# Patient Record
Sex: FEMALE | Race: BLACK OR AFRICAN AMERICAN | Employment: UNEMPLOYED | URBAN - METROPOLITAN AREA
[De-identification: names, ages, dates, MRNs, and addresses within clinical notes are randomized per-mention and may not be internally consistent; named-entity substitution may affect disease eponyms.]

---

## 2017-11-16 ENCOUNTER — OFFICE VISIT (OUTPATIENT)
Dept: NEUROLOGY | Age: 42
End: 2017-11-16

## 2017-11-16 VITALS
BODY MASS INDEX: 45.36 KG/M2 | WEIGHT: 289 LBS | HEART RATE: 114 BPM | SYSTOLIC BLOOD PRESSURE: 172 MMHG | TEMPERATURE: 98.5 F | HEIGHT: 67 IN | DIASTOLIC BLOOD PRESSURE: 102 MMHG

## 2017-11-16 DIAGNOSIS — I67.1 CEREBRAL ANEURYSM: Primary | ICD-10-CM

## 2017-11-16 DIAGNOSIS — I10 ESSENTIAL HYPERTENSION: ICD-10-CM

## 2017-11-16 NOTE — PROGRESS NOTES
Bruce Flores is a 40 y.o. female presenting with Cerebral Aneurysm        HPI: Bruce Flores is a 40year old female who was referred to the [address] [place] [address] by Dr. Haider Bolivar for cerebral aneurysm. The patient has been having headaches but within the last couple months they have been every day. She reports having had Toradol injections and Fioricet. She went to see Dr. Haider Bolivar and had studies done and started topiramate which is helping. She reports her headaches as starting left occipital then involving the whole head. They are throbbing in nature and associated with lightheadedness, nausea, photophobia, and blurry vision. Sometimes they last the whole day. She reports some tingling of the fingertips on both hands but denies stroke-like symptoms. MRA brain on 10/5/17 reported a possible 2 mm saccular aneurysm arising along the right side of the distal A2 segment of the anterior cerebral artery distribution. She had a grandmother and a cousin who  from  ruptured cerebral aneurysm. She has an aunt who had a cerebral aneurysm that was treated. They were all on maternal side. The patient had an endometrial ablation. 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 Labs in [facility] reveal a hemoglobin A1c 6.6, total cholesterol 177, and . She is a nonsmoker. Blood pressure is elevated at her visit. Review of Systems   Constitutional: Negative. HENT: Negative. Eyes: Negative. Respiratory: Negative. Cardiovascular: Negative. Gastrointestinal: Negative. Genitourinary: Negative. Musculoskeletal: Negative. Skin: Negative. Neurological: Positive for headaches. Endo/Heme/Allergies: Negative. Psychiatric/Behavioral: Negative.         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 Social History Main Topics    Smoking status: Never Smoker    Smokeless tobacco: Never Used    Alcohol use Yes      Comment: socially    Drug use: No    Sexual activity: Not on file     Other Topics Concern    Not on file     Social History Narrative       Family History   Problem Relation Age of Onset    Diabetes Maternal Aunt     Hypertension Maternal Aunt     Diabetes Maternal Uncle     Hypertension Maternal Uncle        Vitals:    11/16/17 1445 11/16/17 1550   BP: (!) 170/100 (!) 172/102   Pulse: (!) 114    Temp: 98.5 °F (36.9 °C)    TempSrc: Oral    Weight: 131.1 kg (289 lb)    Height: 5' 7\" (1.702 m)         Neurologic Exam     Mental Status   Oriented to person, place, and time. Follows 3 step commands. Attention: normal. Concentration: normal.   Speech: speech is normal   Level of consciousness: alert  Knowledge: good. Normal comprehension. Cranial Nerves     CN II   Visual fields full to confrontation. CN III, IV, VI   Pupils are equal, round, and reactive to light. Extraocular motions are normal.   Nystagmus: none   Diplopia: none  Ophthalmoparesis: none  Upgaze: normal  Downgaze: normal  Conjugate gaze: present    CN V   Facial sensation intact. CN VII   Facial expression full, symmetric. CN VIII   Hearing: intact    CN XI   CN XI normal.     CN XII   Tongue deviation: none    Motor Exam     Strength   Strength 5/5 throughout.      Sensory Exam   Light touch normal.     Gait, Coordination, and Reflexes     Gait  Gait: normal    Coordination   Finger to nose coordination: normal    Tremor   Resting tremor: absent      Physical Exam   Constitutional: She is oriented to person, place, and time. She appears well-developed and well-nourished. HENT:   Head: Normocephalic and atraumatic. Eyes: EOM are normal. Pupils are equal, round, and reactive to light. Cardiovascular: Normal rate and regular rhythm. Pulmonary/Chest: Effort normal and breath sounds normal.   Neurological: She is alert and oriented to person, place, and time. She has normal strength. She has a normal Finger-Nose-Finger Test. Gait normal.   Skin: Skin is warm and dry. Psychiatric: She has a normal mood and affect. Her speech is normal and behavior is normal. Judgment and thought content normal.   Vitals reviewed. Data: Personal review of her neuroimaging studies shows:  MRI/A 10/05/2017     RA2 azygous with distal laterally directed aneurysm 2.2mm x 2.5mm x 2.2mm     LPCoA infundibulum vs 1.2mm aneurysm     RAICA infundibulum    Assessment and Plan: This is a 40year old female with right anterior cerebral artery distal aneurysm and other possible aneurysm or infundibulum. We discussed cerebral aneurysms with the aid of the aneurysm booklet and reviewed her images. We went over different methods for monitoring and treatment of cerebral aneurysm. She is quite concerned about her aneurysm considering the family history of multiple family members who have had a cerebral aneurysm and in two it had been fatal. Offered monitoring with a follow-up CTA of the head in 6 months (April 2018) versus proceeding with diagnostic cerebral angiography in order to assess her cerebral aneurysm and other possible aneurysm. This would help develop plans for possible treatment. She would like to proceed with cerebral angiography at this time. We discussed measures for good blood vessel health especially blood pressure control.   Her blood pressure is elevated into the 144X systolic at her visit. She was encouraged to follow up with her PCP to work on blood pressure control prior to this. Also encouraged weight loss and maintaining a healthy weight, diet, exercise, and continuing glycemic control. She does not smoke. She has two friends with her at her visit who provide support. The symptoms that should prompt the patient to call 911 and present to the ED were discussed. Total time 60 minutes and 40 minutes spent in counseling.

## 2017-11-16 NOTE — MR AVS SNAPSHOT
Visit Information Date & Time Provider Department Dept. Phone Encounter #  
 11/16/2017  2:00 PM Jose M Nunez, [address] [place] [address] [address] [id number] Upcoming Health Maintenance Date Due DTaP/Tdap/Td series (1 - Tdap) 4/7/1997 PAP AKA CERVICAL CYTOLOGY 4/7/1997 Influenza Age 5 to Adult 8/1/2017 Allergies as of 11/16/2017  Review Complete On: 11/16/2017 By: Jude Gan No Known Allergies Current Immunizations  Never Reviewed No immunizations on file. Not reviewed this visit Vitals BP Pulse Temp Height(growth percentile) Weight(growth percentile) BMI  
 (!) 172/102 (!) 114 98.5 °F (36.9 °C) (Oral) 5' 7\" (1.702 m) 289 lb (131.1 kg) 45.26 kg/m2 Smoking Status Never Smoker Vitals History BMI and BSA Data Body Mass Index Body Surface Area  
 45.26 kg/m 2 2.49 m 2 Preferred Pharmacy Pharmacy Name Phone [facility] [address]ina [address] [place] [address] [place] [phone] Your Updated Medication List  
  
   
This list is accurate as of: 11/16/17  4:35 PM.  Always use your most recent med list. HYZAAR PO Take  by mouth. TOPAMAX PO Take  by mouth. Introducing \[facility]\"" & [facility]! Bryan Piedra introduces [employer] patient portal. Now you can access parts of your medical record, email your doctor's office, and request medication refills online. 1. In your internet browser, go to [email or web address] 2. Click on the First Time User? Click Here link in the Sign In box. You will see the New Member Sign Up page. 3. Enter your [employer] Access Code exactly as it appears below. You will not need to use this code after youve completed the sign-up process. If you do not sign up before the expiration date, you must request a new code. · [employer] Access Code: [email or web address] Expires: 2/14/2018  4:35 PM 
 
 4. Enter the last four digits of your Social Security Number (xxxx) and Date of Birth (mm/dd/yyyy) as indicated and click Submit. You will be taken to the next sign-up page. 5. Create a [employer] ID. This will be your [employer] login ID and cannot be changed, so think of one that is secure and easy to remember. 6. Create a [employer] password. You can change your password at any time. 7. Enter your Password Reset Question and Answer. This can be used at a later time if you forget your password. 8. Enter your e-mail address. You will receive e-mail notification when new information is available in [address] 9. Click Sign Up. You can now view and download portions of your medical record. 10. Click the Download Summary menu link to download a portable copy of your medical information. If you have questions, please visit the Frequently Asked Questions section of the [employer] website. Remember, [employer] is NOT to be used for urgent needs. For medical emergencies, dial 911. Now available from your iPhone and Android! Please provide this summary of care documentation to your next provider. Your primary care clinician is listed as Tere Essex. If you have any questions after today's visit, please call [phone].

## 2017-11-30 RX ORDER — SODIUM CHLORIDE 9 MG/ML
150 INJECTION, SOLUTION INTRAVENOUS CONTINUOUS
Status: CANCELLED | OUTPATIENT
Start: 2017-11-30

## 2017-12-04 PROBLEM — I10 ESSENTIAL HYPERTENSION: Status: ACTIVE | Noted: 2017-12-04

## 2017-12-04 PROBLEM — I67.1 CEREBRAL ANEURYSM WITHOUT RUPTURE: Status: ACTIVE | Noted: 2017-12-04

## 2017-12-05 ENCOUNTER — HOSPITAL ENCOUNTER (OUTPATIENT)
Dept: INTERVENTIONAL RADIOLOGY/VASCULAR | Age: 42
Discharge: HOME | End: 2017-12-05
Attending: PSYCHIATRY & NEUROLOGY | Admitting: PSYCHIATRY & NEUROLOGY
Payer: COMMERCIAL

## 2017-12-05 VITALS
SYSTOLIC BLOOD PRESSURE: 140 MMHG | DIASTOLIC BLOOD PRESSURE: 80 MMHG | HEART RATE: 78 BPM | BODY MASS INDEX: 42.69 KG/M2 | WEIGHT: 272 LBS | OXYGEN SATURATION: 98 % | TEMPERATURE: 98.2 F | RESPIRATION RATE: 14 BRPM | HEIGHT: 67 IN

## 2017-12-05 DIAGNOSIS — I67.1 CEREBRAL ANEURYSM: ICD-10-CM

## 2017-12-05 PROBLEM — Z82.49 FAMILY HISTORY OF CEREBRAL ANEURYSM: Status: ACTIVE | Noted: 2017-12-05

## 2017-12-05 PROBLEM — E66.01 OBESITY, MORBID (HCC): Status: ACTIVE | Noted: 2017-12-05

## 2017-12-05 LAB
ABO + RH BLD: NORMAL
AMPHET UR QL SCN: NEGATIVE
APTT PPP: 31.1 SEC (ref 23–36.4)
BARBITURATES UR QL SCN: NEGATIVE
BENZODIAZ UR QL: NEGATIVE
BLOOD GROUP ANTIBODIES SERPL: NORMAL
BUN SERPL-MCNC: 11 MG/DL (ref 7–18)
CANNABINOIDS UR QL SCN: NEGATIVE
COCAINE UR QL SCN: NEGATIVE
CREAT SERPL-MCNC: 0.96 MG/DL (ref 0.6–1.3)
ERYTHROCYTE [DISTWIDTH] IN BLOOD BY AUTOMATED COUNT: 14.7 % (ref 11.6–14.5)
HCG UR QL: NEGATIVE
HCG UR QL: NEGATIVE
HCT VFR BLD AUTO: 40.6 % (ref 35–45)
HDSCOM,HDSCOM: NORMAL
HGB BLD-MCNC: 13.7 G/DL (ref 12–16)
INR PPP: 1 (ref 0.8–1.2)
MCH RBC QN AUTO: 32.7 PG (ref 24–34)
MCHC RBC AUTO-ENTMCNC: 33.7 G/DL (ref 31–37)
MCV RBC AUTO: 96.9 FL (ref 74–97)
METHADONE UR QL: NEGATIVE
OPIATES UR QL: NEGATIVE
PCP UR QL: NEGATIVE
PLATELET # BLD AUTO: 382 K/UL (ref 135–420)
PMV BLD AUTO: 9.9 FL (ref 9.2–11.8)
PROTHROMBIN TIME: 12.6 SEC (ref 11.5–15.2)
RBC # BLD AUTO: 4.19 M/UL (ref 4.2–5.3)
SPECIMEN EXP DATE BLD: NORMAL
WBC # BLD AUTO: 10.6 K/UL (ref 4.6–13.2)

## 2017-12-05 PROCEDURE — 80307 DRUG TEST PRSMV CHEM ANLYZR: CPT | Performed by: NURSE PRACTITIONER

## 2017-12-05 PROCEDURE — 85027 COMPLETE CBC AUTOMATED: CPT | Performed by: NURSE PRACTITIONER

## 2017-12-05 PROCEDURE — 74011000250 HC RX REV CODE- 250: Performed by: PSYCHIATRY & NEUROLOGY

## 2017-12-05 PROCEDURE — 81025 URINE PREGNANCY TEST: CPT | Performed by: NURSE PRACTITIONER

## 2017-12-05 PROCEDURE — 86900 BLOOD TYPING SEROLOGIC ABO: CPT | Performed by: NURSE PRACTITIONER

## 2017-12-05 PROCEDURE — 85610 PROTHROMBIN TIME: CPT | Performed by: NURSE PRACTITIONER

## 2017-12-05 PROCEDURE — 36224 PLACE CATH CAROTD ART: CPT

## 2017-12-05 PROCEDURE — 82565 ASSAY OF CREATININE: CPT | Performed by: NURSE PRACTITIONER

## 2017-12-05 PROCEDURE — 85730 THROMBOPLASTIN TIME PARTIAL: CPT | Performed by: NURSE PRACTITIONER

## 2017-12-05 PROCEDURE — 74011636320 HC RX REV CODE- 636/320

## 2017-12-05 PROCEDURE — 74011250636 HC RX REV CODE- 250/636: Performed by: NURSE PRACTITIONER

## 2017-12-05 PROCEDURE — 84520 ASSAY OF UREA NITROGEN: CPT | Performed by: NURSE PRACTITIONER

## 2017-12-05 PROCEDURE — 36415 COLL VENOUS BLD VENIPUNCTURE: CPT | Performed by: NURSE PRACTITIONER

## 2017-12-05 PROCEDURE — 74011250636 HC RX REV CODE- 250/636: Performed by: PSYCHIATRY & NEUROLOGY

## 2017-12-05 RX ORDER — ACETAMINOPHEN 325 MG/1
650 TABLET ORAL
Status: DISCONTINUED | OUTPATIENT
Start: 2017-12-05 | End: 2017-12-05 | Stop reason: HOSPADM

## 2017-12-05 RX ORDER — SODIUM CHLORIDE 9 MG/ML
150 INJECTION, SOLUTION INTRAVENOUS CONTINUOUS
Status: DISPENSED | OUTPATIENT
Start: 2017-12-05 | End: 2017-12-05

## 2017-12-05 RX ORDER — LOSARTAN POTASSIUM AND HYDROCHLOROTHIAZIDE 25; 100 MG/1; MG/1
1 TABLET ORAL DAILY
COMMUNITY
End: 2019-05-31 | Stop reason: SDUPTHER

## 2017-12-05 RX ORDER — LIDOCAINE HYDROCHLORIDE 20 MG/ML
1-50 INJECTION, SOLUTION INFILTRATION; PERINEURAL
Status: DISCONTINUED | OUTPATIENT
Start: 2017-12-05 | End: 2017-12-05

## 2017-12-05 RX ORDER — SODIUM CHLORIDE 9 MG/ML
150 INJECTION, SOLUTION INTRAVENOUS CONTINUOUS
Status: DISCONTINUED | OUTPATIENT
Start: 2017-12-05 | End: 2017-12-05

## 2017-12-05 RX ORDER — FENTANYL CITRATE 50 UG/ML
25-100 INJECTION, SOLUTION INTRAMUSCULAR; INTRAVENOUS
Status: DISCONTINUED | OUTPATIENT
Start: 2017-12-05 | End: 2017-12-05

## 2017-12-05 RX ORDER — METOPROLOL SUCCINATE 25 MG/1
25 TABLET, EXTENDED RELEASE ORAL DAILY
COMMUNITY
End: 2019-05-31 | Stop reason: ALTCHOICE

## 2017-12-05 RX ORDER — IODIXANOL 270 MG/ML
1-100 INJECTION, SOLUTION INTRAVASCULAR ONCE
Status: COMPLETED | OUTPATIENT
Start: 2017-12-05 | End: 2017-12-05

## 2017-12-05 RX ORDER — IODIXANOL 270 MG/ML
101-150 INJECTION, SOLUTION INTRAVASCULAR ONCE
Status: COMPLETED | OUTPATIENT
Start: 2017-12-05 | End: 2017-12-05

## 2017-12-05 RX ORDER — MIDAZOLAM HYDROCHLORIDE 1 MG/ML
.25-2 INJECTION, SOLUTION INTRAMUSCULAR; INTRAVENOUS
Status: DISCONTINUED | OUTPATIENT
Start: 2017-12-05 | End: 2017-12-05

## 2017-12-05 RX ADMIN — HEPARIN SODIUM 1000 ML: 1000 INJECTION, SOLUTION INTRAVENOUS; SUBCUTANEOUS at 10:30

## 2017-12-05 RX ADMIN — MIDAZOLAM HYDROCHLORIDE 0.5 MG: 1 INJECTION, SOLUTION INTRAMUSCULAR; INTRAVENOUS at 10:29

## 2017-12-05 RX ADMIN — IODIXANOL 37 ML: 270 INJECTION, SOLUTION INTRAVASCULAR at 12:00

## 2017-12-05 RX ADMIN — MIDAZOLAM HYDROCHLORIDE 0.5 MG: 1 INJECTION, SOLUTION INTRAMUSCULAR; INTRAVENOUS at 10:07

## 2017-12-05 RX ADMIN — IODIXANOL 118 ML: 270 INJECTION, SOLUTION INTRAVASCULAR at 11:59

## 2017-12-05 RX ADMIN — HEPARIN SODIUM 600 ML: 1000 INJECTION, SOLUTION INTRAVENOUS; SUBCUTANEOUS at 11:58

## 2017-12-05 RX ADMIN — LIDOCAINE HYDROCHLORIDE 100 MG: 20 INJECTION, SOLUTION INFILTRATION; PERINEURAL at 10:13

## 2017-12-05 RX ADMIN — MIDAZOLAM HYDROCHLORIDE 1 MG: 1 INJECTION, SOLUTION INTRAMUSCULAR; INTRAVENOUS at 10:00

## 2017-12-05 RX ADMIN — HEPARIN SODIUM 200 ML: 1000 INJECTION, SOLUTION INTRAVENOUS; SUBCUTANEOUS at 11:58

## 2017-12-05 RX ADMIN — FENTANYL CITRATE 50 MCG: 50 INJECTION, SOLUTION INTRAMUSCULAR; INTRAVENOUS at 10:07

## 2017-12-05 RX ADMIN — IODIXANOL 100 ML: 270 INJECTION, SOLUTION INTRAVASCULAR at 11:59

## 2017-12-05 RX ADMIN — FENTANYL CITRATE 100 MCG: 50 INJECTION, SOLUTION INTRAMUSCULAR; INTRAVENOUS at 10:00

## 2017-12-05 RX ADMIN — SODIUM CHLORIDE 150 ML/HR: 900 INJECTION, SOLUTION INTRAVENOUS at 11:43

## 2017-12-05 RX ADMIN — SODIUM CHLORIDE 150 ML/HR: 900 INJECTION, SOLUTION INTRAVENOUS at 12:05

## 2017-12-05 RX ADMIN — FENTANYL CITRATE 50 MCG: 50 INJECTION, SOLUTION INTRAMUSCULAR; INTRAVENOUS at 10:29

## 2017-12-05 RX ADMIN — MIDAZOLAM HYDROCHLORIDE 0.5 MG: 1 INJECTION, SOLUTION INTRAMUSCULAR; INTRAVENOUS at 11:24

## 2017-12-05 RX ADMIN — FENTANYL CITRATE 50 MCG: 50 INJECTION, SOLUTION INTRAMUSCULAR; INTRAVENOUS at 11:24

## 2017-12-05 RX ADMIN — SODIUM CHLORIDE 150 ML/HR: 900 INJECTION, SOLUTION INTRAVENOUS at 08:12

## 2017-12-05 NOTE — PROGRESS NOTES
conducted an initial consultation and Spiritual Assessment for Angela Myers, who is a 40 y.o.,female. Patients Primary Language is: [place]. According to the patients EMR [employer] Affiliation is: No Temple. The reason the Patient came to the hospital is:   Patient Active Problem List    Diagnosis Date Noted    RA2 azygous cerebral aneurysm 12/05/2017    Family history of cerebral aneurysm 12/05/2017    Obesity, morbid ([address] 12/05/2017    Essential hypertension 12/04/2017    LAChA infundibulum vs small aneurusm 12/04/2017        The  provided the following Interventions:  Initiated a relationship of care and support. Explored issues of vonda, spirituality and/or Buddhist needs while hospitalized. Listened empathically. Provided chaplaincy education. Provided information about Spiritual Care Services. Offered prayer and assurance of continued prayers on patient's behalf. Chart reviewed. The following outcomes were achieved:  Patient shared some information about their medical narrative and spiritual journey/beliefs. Patient processed feeling about current hospitalization. Patient expressed gratitude for the 's visit. Assessment:  Patient did not indicate any spiritual or Buddhist issues which require Spiritual Care Services interventions at this time. Patient does not have any Buddhist/cultural needs that will affect patients preferences in health care. Plan:  Chaplains will continue to follow and will provide pastoral care on an as needed or requested basis.  recommends bedside caregivers page  on duty if patient shows signs of acute spiritual or emotional distress.     [address] [facility]   Staff [address] [facility]   [phone]

## 2017-12-05 NOTE — PROGRESS NOTES
1205 Pt received via bed from neurovascular lab post angiogram. Pt is alert and denies pain. Dressing to right groin is clean, dry, and intact. No bleeding or hematoma present. VS stable. NIH 0. Will continue to monitor per IVCU protocol and MD orders. 200 Dr. Patricia Sevilla at the bedside to discuss procedure findings and follow up.     1300 Pt HOB 30 degrees in reverse Trendelenburg position. Provided pt with lunch. Friends of pt at the bedside to assist.     1500 Pt resting in bed. No complaints or complications at this time. Will continue to monitor. 1600 Discharge instructions provided. Questions asked and answered. Pt out of bed to bedside commode to void. 1628 Pt ambulated and denies pain, shortness of breath, dizziness, or nausea. Pt escorted via wheelchair to car to be driven by friend. Pt left in stable condition.

## 2017-12-05 NOTE — ROUTINE PROCESS
TRANSFER - OUT REPORT:    Verbal report given to Freddy Brian RN (name) on Vicenta Green  being transferred to [facility] ([facility] for routine progression of care       Report consisted of patients Situation, Background, Assessment and   Recommendations(SBAR). Information from the following report(s) SBAR, Procedure Summary and Intake/Output was reviewed with the receiving nurse. Lines:   Peripheral IV 12/05/17 Left Hand (Active)   Site Assessment Clean, dry, & intact 12/5/2017  8:11 AM   Phlebitis Assessment 0 12/5/2017  8:11 AM   Infiltration Assessment 0 12/5/2017  8:11 AM   Dressing Status Clean, dry, & intact 12/5/2017  8:11 AM   Dressing Type Tape;Transparent 12/5/2017  8:11 AM   Hub Color/Line Status Infusing;Pink 12/5/2017  8:11 AM   Alcohol Cap Used Yes 12/5/2017  8:11 AM        Opportunity for questions and clarification was provided. Patient transported with:   Registered Nurse Jocelin Jolly RN and uZlay Wheeler RN. VSS on room air. Dual pulse, groin, neuro, and NIH checks done with receiving RN. No complaints of pain. All questions answered.

## 2017-12-05 NOTE — INTERVAL H&P NOTE
H&P Update:  Bruce Flores was seen and examined. History and physical has been reviewed. The patient has been examined. There have been no significant clinical changes since the completion of the originally dated History and Physical.    Vital signs:  Patient Vitals for the past 12 hrs:   Temp Pulse Resp BP SpO2   12/05/17 0804 - 86 - 132/79 96 %   12/05/17 0745 98.2 °F (36.8 °C) 94 16 (!) 154/102 98 %     Labs:  Platelet count 190T  INR 1.0   Serum creatinine 0.96  POC urine pregnancy test negative   UDS negative    Assessment/Plan: This is a 40year old female with right anterior cerebral artery distal aneurysm and other possible aneurysm or infundibulum. The benefits, risks, and alternatives to diagnostic cerebral angiography with moderate sedation were discussed with the patient and she consents to proceed. Her case will be discussed in multidisciplinary neurovascular case conference. Blood pressure control was discussed.      Signed By: Oliver Angelo NP     December 5, 2017 9:33 AM

## 2017-12-05 NOTE — DISCHARGE INSTRUCTIONS
Cerebral Angiography Discharge Instructions    *Check the puncture site frequently for swelling or bleeding. If you see any bleeding, lie down and apply pressure over the area with a clean town or washcloth. Notify your doctor for any redness, swelling, drainage or oozing from the puncture site. Notify your doctor for any fever or chills. *If the leg with the puncture becomes cold, numb or painful, call Dr Sona Joel at  [phone]. *Activity should be limited for the next 48 hours. Climb stairs as little as possible and avoid any stooping, bending or strenuous activity for 48 hours. No heavy lifting, pushing, or pulling (anything over 10 pounds) for 7 days. *Do not drive for 24 hours. *You may resume your usual diet. Drink more fluids than usual.    *Have a responsible person drive you home and stay with you for at least 24 hours after your angiography. *You may remove the bandage from your right groin in 24 hours. You may shower in 24 hours. No tub baths, hot tubs or swimming for one week. Do not place any lotions, creams, powders, ointments over the puncture site for one week. You may place a clean band-aid over the puncture site each day for 5 days. Change this daily. DISCHARGE SUMMARY from Nurse    PATIENT INSTRUCTIONS:    After general anesthesia or intravenous sedation, for 24 hours or while taking prescription Narcotics:  · Limit your activities  · Do not drive and operate hazardous machinery  · Do not make important personal or business decisions  · Do  not drink alcoholic beverages  · If you have not urinated within 8 hours after discharge, please contact your surgeon on call.     Report the following to your surgeon:  · Excessive pain, swelling, redness or odor of or around the surgical area  · Temperature over 100.5  · Nausea and vomiting lasting longer than 4 hours or if unable to take medications  · Any signs of decreased circulation or nerve impairment to extremity: change in color, persistent  numbness, tingling, coldness or increase pain  · Any questions    What to do at Home:  Recommended activity: Activity as tolerated and no driving for today and No heavy lifting, pushing, or pulling for 1 week, no greater than 10 pounds. If you experience any of the following symptoms pain, swelling, or bleeding at the puncture site, please follow up with emergency room. *  Please give a list of your current medications to your Primary Care Provider. *  Please update this list whenever your medications are discontinued, doses are      changed, or new medications (including over-the-counter products) are added. *  Please carry medication information at all times in case of emergency situations. These are general instructions for a healthy lifestyle:    No smoking/ No tobacco products/ Avoid exposure to second hand smoke  Surgeon General's Warning:  Quitting smoking now greatly reduces serious risk to your health. Obesity, smoking, and sedentary lifestyle greatly increases your risk for illness    A healthy diet, regular physical exercise & weight monitoring are important for maintaining a healthy lifestyle    You may be retaining fluid if you have a history of heart failure or if you experience any of the following symptoms:  Weight gain of 3 pounds or more overnight or 5 pounds in a week, increased swelling in our hands or feet or shortness of breath while lying flat in bed. Please call your doctor as soon as you notice any of these symptoms; do not wait until your next office visit. Recognize signs and symptoms of STROKE:    F-face looks uneven    A-arms unable to move or move unevenly    S-speech slurred or non-existent    T-time-call 911 as soon as signs and symptoms begin-DO NOT go       Back to bed or wait to see if you get better-TIME IS BRAIN. Warning Signs of HEART ATTACK     Call 911 if you have these symptoms:   Chest discomfort.  Most heart attacks involve discomfort in the center of the chest that lasts more than a few minutes, or that goes away and comes back. It can feel like uncomfortable pressure, squeezing, fullness, or pain.  Discomfort in other areas of the upper body. Symptoms can include pain or discomfort in one or both arms, the back, neck, jaw, or stomach.  Shortness of breath with or without chest discomfort.  Other signs may include breaking out in a cold sweat, nausea, or lightheadedness. Don't wait more than five minutes to call 911 - MINUTES MATTER! Fast action can save your life. Calling 911 is almost always the fastest way to get lifesaving treatment. Emergency Medical Services staff can begin treatment when they arrive -- up to an hour sooner than if someone gets to the hospital by car. The discharge information has been reviewed with the patient and friend. The patient and friend verbalized understanding. Discharge medications reviewed with the patient and friend and appropriate educational materials and side effects teaching were provided.

## 2017-12-05 NOTE — H&P (VIEW-ONLY)
Norm Mares is a 40 y.o. female presenting with Cerebral Aneurysm        HPI: Norm Mares is a 40year old female who was referred to the [address] [place] [address] by Dr. Dwayne Velazquez for cerebral aneurysm. The patient has been having headaches but within the last couple months they have been every day. She reports having had Toradol injections and Fioricet. She went to see Dr. Dwayne Velazquez and had studies done and started topiramate which is helping. She reports her headaches as starting left occipital then involving the whole head. They are throbbing in nature and associated with lightheadedness, nausea, photophobia, and blurry vision. Sometimes they last the whole day. She reports some tingling of the fingertips on both hands but denies stroke-like symptoms. MRA brain on 10/5/17 reported a possible 2 mm saccular aneurysm arising along the right side of the distal A2 segment of the anterior cerebral artery distribution. She had a grandmother and a cousin who  from  ruptured cerebral aneurysm. She has an aunt who had a cerebral aneurysm that was treated. They were all on maternal side. The patient had an endometrial ablation. 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 Labs in [facility] reveal a hemoglobin A1c 6.6, total cholesterol 177, and . She is a nonsmoker. Blood pressure is elevated at her visit. Review of Systems   Constitutional: Negative. HENT: Negative. Eyes: Negative. Respiratory: Negative. Cardiovascular: Negative. Gastrointestinal: Negative. Genitourinary: Negative. Musculoskeletal: Negative. Skin: Negative. Neurological: Positive for headaches. Endo/Heme/Allergies: Negative. Psychiatric/Behavioral: Negative.         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 Social History Main Topics    Smoking status: Never Smoker    Smokeless tobacco: Never Used    Alcohol use Yes      Comment: socially    Drug use: No    Sexual activity: Not on file     Other Topics Concern    Not on file     Social History Narrative       Family History   Problem Relation Age of Onset    Diabetes Maternal Aunt     Hypertension Maternal Aunt     Diabetes Maternal Uncle     Hypertension Maternal Uncle        Vitals:    11/16/17 1445 11/16/17 1550   BP: (!) 170/100 (!) 172/102   Pulse: (!) 114    Temp: 98.5 °F (36.9 °C)    TempSrc: Oral    Weight: 131.1 kg (289 lb)    Height: 5' 7\" (1.702 m)         Neurologic Exam     Mental Status   Oriented to person, place, and time. Follows 3 step commands. Attention: normal. Concentration: normal.   Speech: speech is normal   Level of consciousness: alert  Knowledge: good. Normal comprehension. Cranial Nerves     CN II   Visual fields full to confrontation. CN III, IV, VI   Pupils are equal, round, and reactive to light. Extraocular motions are normal.   Nystagmus: none   Diplopia: none  Ophthalmoparesis: none  Upgaze: normal  Downgaze: normal  Conjugate gaze: present    CN V   Facial sensation intact. CN VII   Facial expression full, symmetric. CN VIII   Hearing: intact    CN XI   CN XI normal.     CN XII   Tongue deviation: none    Motor Exam     Strength   Strength 5/5 throughout.      Sensory Exam   Light touch normal.     Gait, Coordination, and Reflexes     Gait  Gait: normal    Coordination   Finger to nose coordination: normal    Tremor   Resting tremor: absent      Physical Exam   Constitutional: She is oriented to person, place, and time. She appears well-developed and well-nourished. HENT:   Head: Normocephalic and atraumatic. Eyes: EOM are normal. Pupils are equal, round, and reactive to light. Cardiovascular: Normal rate and regular rhythm. Pulmonary/Chest: Effort normal and breath sounds normal.   Neurological: She is alert and oriented to person, place, and time. She has normal strength. She has a normal Finger-Nose-Finger Test. Gait normal.   Skin: Skin is warm and dry. Psychiatric: She has a normal mood and affect. Her speech is normal and behavior is normal. Judgment and thought content normal.   Vitals reviewed. Data: Personal review of her neuroimaging studies shows:  MRI/A 10/05/2017     RA2 azygous with distal laterally directed aneurysm 2.2mm x 2.5mm x 2.2mm     LPCoA infundibulum vs 1.2mm aneurysm     RAICA infundibulum    Assessment and Plan: This is a 40year old female with right anterior cerebral artery distal aneurysm and other possible aneurysm or infundibulum. We discussed cerebral aneurysms with the aid of the aneurysm booklet and reviewed her images. We went over different methods for monitoring and treatment of cerebral aneurysm. She is quite concerned about her aneurysm considering the family history of multiple family members who have had a cerebral aneurysm and in two it had been fatal. Offered monitoring with a follow-up CTA of the head in 6 months (April 2018) versus proceeding with diagnostic cerebral angiography in order to assess her cerebral aneurysm and other possible aneurysm. This would help develop plans for possible treatment. She would like to proceed with cerebral angiography at this time. We discussed measures for good blood vessel health especially blood pressure control.   Her blood pressure is elevated into the 090S systolic at her visit. She was encouraged to follow up with her PCP to work on blood pressure control prior to this. Also encouraged weight loss and maintaining a healthy weight, diet, exercise, and continuing glycemic control. She does not smoke. She has two friends with her at her visit who provide support. The symptoms that should prompt the patient to call 911 and present to the ED were discussed. Total time 60 minutes and 40 minutes spent in counseling.

## 2017-12-05 NOTE — IP AVS SNAPSHOT
Johan Cruz 
 
 
 [id number] 
[phone] Patient: Cedric Kingsley MRN: [id number] VTN:9/5/1841 About your hospitalization You were admitted on:  December 5, 2017 You last received care in the:  [facility] 2 INTENSIVE CARE 3 You were discharged on:  December 5, 2017 Why you were hospitalized Your primary diagnosis was: Anterior Cerebral Artery Aneurysm Your diagnoses also included:  Cerebral Aneurysm Without Rupture, Essential Hypertension, Family History Of Cerebral Aneurysm, Obesity, Morbid (Hcc) Things You Need To Do (next 8 weeks) Follow up with Jose Walden MD  
Follow up as previously scheduled for blood pressure management. Phone:  [phone] Where:  [facility] Thursday Jan 11, 2018 Follow Up with Reginald Melgoza MD at 11:30 AM  
Where: [facility]) Discharge Orders None A check angelica indicates which time of day the medication should be taken. My Medications TAKE these medications as instructed Instructions Each Dose to Equal  
 Morning Noon Evening Bedtime HYZAAR 100-25 mg per tablet Generic drug:  losartan-hydroCHLOROthiazide Take 1 Tab by mouth daily. 1 Tab  
    
  
   
   
   
  
 metoprolol succinate 25 mg XL tablet Commonly known as:  TOPROL-XL Take 25 mg by mouth daily. 25 mg  
    
  
   
   
   
  
 TOPAMAX PO Take 50 mg by mouth. 50 mg Discharge Instructions Cerebral Angiography Discharge Instructions *Check the puncture site frequently for swelling or bleeding. If you see any bleeding, lie down and apply pressure over the area with a clean town or washcloth. Notify your doctor for any redness, swelling, drainage or oozing from the puncture site. Notify your doctor for any fever or chills. *If the leg with the puncture becomes cold, numb or painful, call Dr Adrian Casas at  [phone]. *Activity should be limited for the next 48 hours. Climb stairs as little as possible and avoid any stooping, bending or strenuous activity for 48 hours. No heavy lifting, pushing, or pulling (anything over 10 pounds) for 7 days. *Do not drive for 24 hours. *You may resume your usual diet. Drink more fluids than usual. 
 
*Have a responsible person drive you home and stay with you for at least 24 hours after your angiography. *You may remove the bandage from your right groin in 24 hours. You may shower in 24 hours. No tub baths, hot tubs or swimming for one week. Do not place any lotions, creams, powders, ointments over the puncture site for one week. You may place a clean band-aid over the puncture site each day for 5 days. Change this daily. DISCHARGE SUMMARY from Nurse PATIENT INSTRUCTIONS: 
 
After general anesthesia or intravenous sedation, for 24 hours or while taking prescription Narcotics: · Limit your activities · Do not drive and operate hazardous machinery · Do not make important personal or business decisions · Do  not drink alcoholic beverages · If you have not urinated within 8 hours after discharge, please contact your surgeon on call. Report the following to your surgeon: 
· Excessive pain, swelling, redness or odor of or around the surgical area · Temperature over 100.5 · Nausea and vomiting lasting longer than 4 hours or if unable to take medications · Any signs of decreased circulation or nerve impairment to extremity: change in color, persistent  numbness, tingling, coldness or increase pain · Any questions What to do at Home: 
Recommended activity: Activity as tolerated and no driving for today and No heavy lifting, pushing, or pulling for 1 week, no greater than 10 pounds.   
 
If you experience any of the following symptoms pain, swelling, or bleeding at the puncture site, please follow up with emergency room. *  Please give a list of your current medications to your Primary Care Provider. *  Please update this list whenever your medications are discontinued, doses are 
    changed, or new medications (including over-the-counter products) are added. *  Please carry medication information at all times in case of emergency situations. These are general instructions for a healthy lifestyle: No smoking/ No tobacco products/ Avoid exposure to second hand smoke Surgeon General's Warning:  Quitting smoking now greatly reduces serious risk to your health. Obesity, smoking, and sedentary lifestyle greatly increases your risk for illness A healthy diet, regular physical exercise & weight monitoring are important for maintaining a healthy lifestyle You may be retaining fluid if you have a history of heart failure or if you experience any of the following symptoms:  Weight gain of 3 pounds or more overnight or 5 pounds in a week, increased swelling in our hands or feet or shortness of breath while lying flat in bed. Please call your doctor as soon as you notice any of these symptoms; do not wait until your next office visit. Recognize signs and symptoms of STROKE: 
 
F-face looks uneven A-arms unable to move or move unevenly S-speech slurred or non-existent T-time-call 911 as soon as signs and symptoms begin-DO NOT go Back to bed or wait to see if you get better-TIME IS BRAIN. Warning Signs of HEART ATTACK Call 911 if you have these symptoms: 
? Chest discomfort. Most heart attacks involve discomfort in the center of the chest that lasts more than a few minutes, or that goes away and comes back. It can feel like uncomfortable pressure, squeezing, fullness, or pain. ? Discomfort in other areas of the upper body. Symptoms can include pain or discomfort in one or both arms, the back, neck, jaw, or stomach. ? Shortness of breath with or without chest discomfort. ? Other signs may include breaking out in a cold sweat, nausea, or lightheadedness. Don't wait more than five minutes to call [address]! Fast action can save your life. Calling 911 is almost always the fastest way to get lifesaving treatment. Emergency Medical Services staff can begin treatment when they arrive  up to an hour sooner than if someone gets to the hospital by car. The discharge information has been reviewed with the patient and friend. The patient and friend verbalized understanding. Discharge medications reviewed with the patient and friend and appropriate educational materials and side effects teaching were provided. Introducing [facility]! [facility] introduces [employer] patient portal. Now you can access parts of your medical record, email your doctor's office, and request medication refills online. 1. In your internet browser, go to [email or web address] and love and you/[employer] 2. Click on the First Time User? Click Here link in the Sign In box. You will see the New Member Sign Up page. 3. Enter your [employer] Access Code exactly as it appears below. You will not need to use this code after youve completed the sign-up process. If you do not sign up before the expiration date, you must request a new code. · [employer] Access Code: [email or web address] Expires: 2/14/2018  4:35 PM 
 
4. Enter the last four digits of your Social Security Number (xxxx) and Date of Birth (mm/dd/yyyy) as indicated and click Submit. You will be taken to the next sign-up page. 5. Create a [employer] ID. This will be your [employer] login ID and cannot be changed, so think of one that is secure and easy to remember. 6. Create a [employer] password. You can change your password at any time. 7. Enter your Password Reset Question and Answer. This can be used at a later time if you forget your password. 8. Enter your e-mail address. You will receive e-mail notification when new information is available in [address] 9. Click Sign Up. You can now view and download portions of your medical record. 10. Click the Download Summary menu link to download a portable copy of your medical information. If you have questions, please visit the Frequently Asked Questions section of the [employer] website. Remember, [employer] is NOT to be used for urgent needs. For medical emergencies, dial 911. Now available from your iPhone and Android! Unresulted Labs-Please follow up with your PCP about these lab tests Order Current Status IR UNLISTED PROCEDURE In process Providers Seen During Your Hospitalization Provider Specialty Primary office phone Marcela Mtz MD Neurology [phone] Your Primary Care Physician (PCP) Primary Care Physician Office Phone Office Fax John Ramírezing [phone] You are allergic to the following Allergen Reactions Zofran (As Hydrochloride) (Ondansetron Hcl) Anxiety  
 agitation Recent Documentation Height Weight BMI OB Status Smoking Status 1.702 m 123.4 kg 42.6 kg/m2 Ablation Never Smoker Emergency Contacts Name Discharge Info Relation Home Work Mobile [facility] DISCHARGE CAREGIVER [3] Friend [5] [phone] Patient Belongings The following personal items are in your possession at time of discharge: 
  Dental Appliances: None         Home Medications: None   Jewelry: Earrings, With patient  Clothing: Pants, Shirt, Footwear, Undergarments, Socks    Other Valuables: Cell Phone Please provide this summary of care documentation to your next provider. Signatures-by signing, you are acknowledging that this After Visit Summary has been reviewed with you and you have received a copy.   
  
 
  
    
    
 Patient Signature: ____________________________________________________________ Date:  ____________________________________________________________  
  
Paulene Greener Provider Signature:  ____________________________________________________________ Date:  ____________________________________________________________

## 2017-12-05 NOTE — BRIEF OP NOTE
NEUROVASCULAR BRIEF OPERATIVE NOTE    Patricia Baca    Date of Procedure:  12/5/2017    Surgeon:  Micheal Prather MD    Preoperative Diagnosis:  multiple cerebral aneurysms    Postoperative Diagnosis:  same     Procedure:  cerebral angiography     Anesthesia:  ivms, local        Specimens:  none    Implants:  none    Estimated Blood Loss:  minimal    Findings:     A2 azygous with RA3 internal frontal branch aneurysm dome pointing toward right 3.9mm x 2.0mm x 1.3mm with neck 1.9mm x 1.5mm on 3D, dome 3.2mm x 2.3mm x 1.6mm on 2D with indeterminate neck. A2 parent proximal 1.8mm and distal 1.8mm. Fills best from LICA. LICA terminus aneurysm dome 2.6mm x 1.8mm  X 2.2mm and neck 2.6mm x 2.2mm on 3D. LAChA duplicated without aneurysm. LUTHER terminus dilatation 0.4mm very small aneurysm versus infindibulum without distal artery visualized.     RAICA infundibulum    Complications:  none immediate

## 2017-12-05 NOTE — PROGRESS NOTES
TRANSFER - IN REPORT:    Telephone report received from Gareth Juarez RN (name) on Darby Cunningham  being received from neurovascular lab (unit) for routine post - op      Report consisted of patients Situation, Background, Assessment and   Recommendations(SBAR). Information from the following report(s) SBAR, MAR and Cardiac Rhythm NSR was reviewed with the receiving nurse. Opportunity for questions and clarification was provided.

## 2018-01-11 ENCOUNTER — OFFICE VISIT (OUTPATIENT)
Dept: NEUROLOGY | Age: 43
End: 2018-01-11

## 2018-01-11 VITALS
HEART RATE: 101 BPM | DIASTOLIC BLOOD PRESSURE: 89 MMHG | HEIGHT: 67 IN | BODY MASS INDEX: 41.75 KG/M2 | WEIGHT: 266 LBS | SYSTOLIC BLOOD PRESSURE: 126 MMHG | TEMPERATURE: 98.2 F

## 2018-01-11 DIAGNOSIS — I67.1 CEREBRAL ANEURYSM WITHOUT RUPTURE: Primary | ICD-10-CM

## 2018-01-11 DIAGNOSIS — I10 ESSENTIAL HYPERTENSION: ICD-10-CM

## 2018-01-11 RX ORDER — AMLODIPINE BESYLATE 10 MG/1
TABLET ORAL DAILY
COMMUNITY
End: 2019-06-27

## 2018-01-11 NOTE — PROGRESS NOTES
Neha Strange is a 40 y.o. female presenting with Follow-up (cerebral aneurysms)      HPI: Neha Strange is a 40year old female who was referred to the [address] [place] [address] by Dr. Vamsi Rodney for cerebral aneurysm. She presents in follow-up after diagnostic cerebral angiography. She still experiences headaches. She has had amlodipine added to her antihypertensive regimen at end of December. Her blood pressure today is in the 824E systolic manually. No new changes. Review of Systems   Constitutional: Negative. HENT: Negative. Eyes: Negative. Respiratory: Negative. Cardiovascular: Negative. Gastrointestinal: Negative. Genitourinary: Negative. Musculoskeletal: Negative. Skin: Negative. Neurological: Positive for headaches. Endo/Heme/Allergies: Negative. Psychiatric/Behavioral: Negative. 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  metoprolol succinate (TOPROL-XL) 25 mg XL tablet Take 25 mg by mouth daily.  losartan-hydroCHLOROthiazide (HYZAAR) 100-25 mg per tablet Take 1 Tab by mouth daily.  TOPIRAMATE (TOPAMAX PO) Take 50 mg by mouth. Social History     Social History    Marital status: UNKNOWN     Spouse name: N/A    Number of children: N/A    Years of education: N/A     Occupational History    Not on file.      Social History Main Topics  Smoking status: Never Smoker    Smokeless tobacco: Never Used    Alcohol use Yes      Comment: socially    Drug use: No    Sexual activity: Not on file     Other Topics Concern    Not on file     Social History Narrative       Family History   Problem Relation Age of Onset    Diabetes Maternal Aunt     Hypertension Maternal Aunt     Diabetes Maternal Uncle     Hypertension Maternal Uncle        Vitals:    01/11/18 1315 01/11/18 1316   BP: 128/80 126/89   Pulse: (!) 104 (!) 101   Temp: 98.2 °F (36.8 °C)    TempSrc: Oral    Weight: 120.7 kg (266 lb)    Height: 5' 7\" (1.702 m)         Neurologic Exam     Mental Status   Oriented to person, place, and time. Attention: normal. Concentration: normal.   Speech: speech is normal   Level of consciousness: alert    Cranial Nerves     CN III, IV, VI   Extraocular motions are normal.   Conjugate gaze: present    CN VII   Facial expression full, symmetric. CN VIII   Hearing: intact    Motor Exam        Moving all extremities. Gait, Coordination, and Reflexes     Gait  Gait: normal    Tremor   Resting tremor: absent      Physical Exam   Constitutional: She is oriented to person, place, and time. She appears well-developed and well-nourished. HENT:   Head: Normocephalic and atraumatic. Eyes: EOM are normal.   Cardiovascular: Normal rate. Pulmonary/Chest: Effort normal.   Neurological: She is alert and oriented to person, place, and time. Gait normal.   Skin: Skin is warm and dry. Psychiatric: She has a normal mood and affect. Her speech is normal and behavior is normal. Judgment and thought content normal.   Vitals reviewed. Data: Personal review of her cerebral angiogram 12/05/2017 shows:    A2 azygous with RA3 internal frontal branch aneurysm dome pointing toward right 3.9mm x 2.0mm x 1.3mm with neck 1.9mm x 1.5mm on 3D, dome 3.2mm x 2.3mm x 1.6mm on 2D with indeterminate neck. A2 parent proximal 1.8mm and distal 1.8mm.   Fills best from LICA.    LICA terminus aneurysm dome 2.6mm x 1.8mm  X 2.2mm and neck 2.6mm x 2.2mm on 3D.     LAChA duplicated without aneurysm.     LUTHER terminus dilatation 0.4mm very small aneurysm versus infindibulum without distal artery visualized.     RAICA infundibulum      Assessment and Plan: This is a 40year old female with multiple cerebral aneurysms. Her case was discussed in multidisciplinary neurovascular case conference. We discussed her imaging findings with the patient and her two friends that accompany her. Options for treatment were discussed including the particular risks of each versus monitoring with surveillance studies. In particular to be considered for treatment were the RA3 internal frontal branch aneurysm and LICA terminus aneurysm. Flow diverter vs clip vs CTA in one year were options that were discussed. She will consider these options. Continue good blood pressure control, which is improving. Family screening for cerebral aneurysms could be offered. The patient should call 911 and present to the ED if sudden headache, N/V, and/or neurological changes occurs. Total time spent 25 minutes with 20 minutes spent in counseling.

## 2018-01-11 NOTE — MR AVS SNAPSHOT
Visit Information Date & Time Provider Department Dept. Phone Encounter #  
 1/11/2018 11:30 AM Camilo Saldana, [address] [place] [address] [address] [id number] Upcoming Health Maintenance Date Due DTaP/Tdap/Td series (1 - Tdap) 4/7/1997 PAP AKA CERVICAL CYTOLOGY 4/7/1997 Influenza Age 5 to Adult 8/1/2017 Allergies as of 1/11/2018  Review Complete On: 1/11/2018 By: Steven Mcmullen Severity Noted Reaction Type Reactions Zofran (As Hydrochloride) [Ondansetron Hcl]  12/05/2017    Anxiety  
 agitation Current Immunizations  Never Reviewed No immunizations on file. Not reviewed this visit You Were Diagnosed With   
  
 Codes Comments Cerebral aneurysm without rupture    -  Primary ICD-10-CM: I67.1 ICD-9-CM: 437.3 Vitals BP Pulse Temp Height(growth percentile) Weight(growth percentile) BMI  
 126/89 (!) 101 98.2 °F (36.8 °C) (Oral) 5' 7\" (1.702 m) 266 lb (120.7 kg) 41.66 kg/m2 OB Status Smoking Status Ablation Never Smoker Vitals History BMI and BSA Data Body Mass Index Body Surface Area  
 41.66 kg/m 2 2.39 m 2 Preferred Pharmacy Pharmacy Name Phone [address] [facility] [place] [phone] Your Updated Medication List  
  
   
This list is accurate as of: 1/11/18  2:13 PM.  Always use your most recent med list. amLODIPine 10 mg tablet Commonly known as:  Latisha Peach Take  by mouth daily. HYZAAR 100-25 mg per tablet Generic drug:  losartan-hydroCHLOROthiazide Take 1 Tab by mouth daily. metoprolol succinate 25 mg XL tablet Commonly known as:  TOPROL-XL Take 25 mg by mouth daily. TOPAMAX PO Take 50 mg by mouth. We Performed the Following REFERRAL TO NEUROSURGERY [[facility]]  Comments:  
 Please evaluate patient for consideration for clipping of LICA terminus aneurysm. (Possible pipeline RA2 aneurysm.) Referral Information Referral ID Referred By Referred To  
  
 [id number], [address] [place], [address] Yahaira Vaz Phone: [phone] Fax: [phone] Visits Status Start Date End Date 1 New Request 1/11/18 1/11/19 If your referral has a status of pending review or denied, additional information will be sent to support the outcome of this decision. Introducing [address]! [facility] introduces [employer] patient portal. Now you can access parts of your medical record, email your doctor's office, and request medication refills online. 1. In your internet browser, go to [email or web address] 2. Click on the First Time User? Click Here link in the Sign In box. You will see the New Member Sign Up page. 3. Enter your [employer] Access Code exactly as it appears below. You will not need to use this code after youve completed the sign-up process. If you do not sign up before the expiration date, you must request a new code. · [employer] Access Code: [email or web address] Expires: 2/14/2018  4:35 PM 
 
4. Enter the last four digits of your Social Security Number (xxxx) and Date of Birth (mm/dd/yyyy) as indicated and click Submit. You will be taken to the next sign-up page. 5. Create a Think[employer] Thing ID. This will be your [employer] Thing login ID and cannot be changed, so think of one that is secure and easy to remember. 6. Create a [employer] password. You can change your password at any time. 7. Enter your Password Reset Question and Answer. This can be used at a later time if you forget your password. 8. Enter your e-mail address. You will receive e-mail notification when new information is available in [address] 9. Click Sign Up. You can now view and download portions of your medical record. 10. Click the Download Summary menu link to download a portable copy of your medical information. If you have questions, please visit the Frequently Asked Questions section of the [employer] website. Remember, [employer] is NOT to be used for urgent needs. For medical emergencies, dial 911. Now available from your iPhone and Android! Please provide this summary of care documentation to your next provider. Your primary care clinician is listed as Deborah Padron. If you have any questions after today's visit, please call [phone].

## 2018-05-07 ENCOUNTER — TELEPHONE (OUTPATIENT)
Dept: NEUROLOGY | Age: 43
End: 2018-05-07

## 2018-05-07 DIAGNOSIS — I67.1 CEREBRAL ANEURYSM WITHOUT RUPTURE: Primary | ICD-10-CM

## 2018-05-07 NOTE — TELEPHONE ENCOUNTER
Pt called; she would like to undergo follow up CTA to assess aneurysms and will follow up at the neurovascular center thereafter. She is considering treatment with clipping and then flow diversion but would like to assess with CTA first. She saw Dr. Danica Gay on Jan. 25th and note was reviewed.

## 2018-05-15 ENCOUNTER — HOSPITAL ENCOUNTER (OUTPATIENT)
Dept: CT IMAGING | Age: 43
Discharge: HOME | End: 2018-05-15
Attending: NURSE PRACTITIONER
Payer: COMMERCIAL

## 2018-05-15 DIAGNOSIS — I67.1 CEREBRAL ANEURYSM WITHOUT RUPTURE: ICD-10-CM

## 2018-05-15 LAB — CREAT UR-MCNC: 0.9 MG/DL (ref 0.6–1.3)

## 2018-05-15 PROCEDURE — 74011636320 HC RX REV CODE- 636/320: Performed by: NURSE PRACTITIONER

## 2018-05-15 PROCEDURE — 82565 ASSAY OF CREATININE: CPT

## 2018-05-15 PROCEDURE — 70496 CT ANGIOGRAPHY HEAD: CPT

## 2018-05-15 RX ADMIN — IOPAMIDOL 55 ML: 612 INJECTION, SOLUTION INTRAVENOUS at 13:03

## 2018-06-12 ENCOUNTER — OFFICE VISIT (OUTPATIENT)
Dept: NEUROLOGY | Age: 43
End: 2018-06-12

## 2018-06-12 DIAGNOSIS — I67.1 CEREBRAL ANEURYSM WITHOUT RUPTURE: ICD-10-CM

## 2018-06-12 DIAGNOSIS — I67.1 ANTERIOR CEREBRAL ARTERY ANEURYSM: Primary | ICD-10-CM

## 2018-06-12 DIAGNOSIS — I10 ESSENTIAL HYPERTENSION: ICD-10-CM

## 2018-06-12 DIAGNOSIS — Z82.49 FAMILY HISTORY OF CEREBRAL ANEURYSM: ICD-10-CM

## 2018-06-12 NOTE — PROGRESS NOTES
Dina Esparza is a 44 y.o. female presenting with Follow-up      HPI: Dina Esparza is a 44year old female who is followed at the neurovascular center for cerebral aneurysms. She presents in follow-up to review the results of CTA of the head which was done 6 months from diagnostic cerebral angiography. CTA head 5/15/18: IMPRESSION:  1. No acute process on non-contrast CT of the head. 2. Right anterior cerebral artery internal frontal branch small aneurysm unchanged within the resolution limits of the technique. 3. Right internal carotid artery terminus very small aneurysm not visualized on this study. 4. Left internal carotid artery terminus small aneurysm unchanged within the resolution limits of the technique. She had seen Dr. Lorri Smart in January to discuss potential clipping. She wanted to undergo follow-up CTA to assess for changes and follow up at the neurovascular center to discuss first although she is interested in treatment. She still experiences headaches. No neurological changes. Her blood pressure is 053 systolic then 578-430 systolic on recheck manually. She is with her friend at her visit. Review of Systems   Constitutional: Negative. HENT: Negative. Eyes: Negative. Respiratory: Negative. Cardiovascular: Negative. Gastrointestinal: Negative. Genitourinary: Negative. Musculoskeletal: Negative. Skin: Negative. Neurological: Positive for headaches. Endo/Heme/Allergies: Negative. Psychiatric/Behavioral: Negative.         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  metoprolol succinate (TOPROL-XL) 25 mg XL tablet Take 25 mg by mouth daily.  losartan-hydroCHLOROthiazide (HYZAAR) 100-25 mg per tablet Take 1 Tab by mouth daily.  TOPIRAMATE (TOPAMAX PO) Take 50 mg by mouth. Social History     Social History    Marital status: UNKNOWN     Spouse name: N/A    Number of children: N/A    Years of education: N/A     Occupational History    Not on file. Social History Main Topics    Smoking status: Never Smoker    Smokeless tobacco: Never Used    Alcohol use Yes      Comment: socially    Drug use: No    Sexual activity: Not on file     Other Topics Concern    Not on file     Social History Narrative       Family History   Problem Relation Age of Onset    Diabetes Maternal Aunt     Hypertension Maternal Aunt     Diabetes Maternal Uncle     Hypertension Maternal Uncle        Vitals:    06/12/18 1425 06/12/18 1426 06/12/18 1500 06/12/18 1501   BP: (!) 149/96 149/80 126/80 130/88   Pulse: 78 78     Temp: 98.3 °F (36.8 °C)      TempSrc: Oral      Weight: 120.7 kg (266 lb)      Height: 5' 7\" (1.702 m)           Neurologic Exam     Mental Status   Oriented to person, place, and time. Attention: normal. Concentration: normal.   Level of consciousness: alert  Knowledge: good. Normal comprehension. Cranial Nerves     CN III, IV, VI   Extraocular motions are normal.   Conjugate gaze: present    CN VII   Facial expression full, symmetric. CN VIII   Hearing: intact    Motor Exam        Moving all extremities     Gait, Coordination, and Reflexes     Gait  Gait: normal    Tremor   Resting tremor: absent      Physical Exam   Constitutional: She is oriented to person, place, and time. She appears well-developed and well-nourished.    HENT: Head: Normocephalic and atraumatic. Eyes: EOM are normal.   Cardiovascular: Normal rate. Pulmonary/Chest: Effort normal.   Neurological: She is alert and oriented to person, place, and time. Gait normal.   Skin: Skin is warm and dry. Psychiatric: She has a normal mood and affect. Her behavior is normal. Judgment and thought content normal.   Vitals reviewed. Assessment and Plan: This is a 44year old female with multiple cerebral aneurysms. We discussed the results of her CTA of the head. The aneurysms are unchanged. She is interested in treatment of the right HUMBERTO aneurysm and LICA terminus aneurysm. Her case was discussed again at multidisciplinary neurovascular conference with Dr. Santos Brush. Her cerebral angiography was reviewed. Plan is clipping of RA3 aneurysm first. LICA terminus aneurysm may be followed thereafter and potentially clipped. Alternatively, another option could be clipping of LICA terminus aneurysm then pipeline RA3. Will have her return to see Dr. Santos Brush to discuss further. Discussed with the patient. Continue blood pressure control with goal of systolic blood pressure 438-448. Healthy diet and weight loss. Family screening for cerebral aneurysms with MRA or CTA of the head could be offered. Total time 30 minutes with 25 minutes spent in counseling.

## 2018-06-12 NOTE — MR AVS SNAPSHOT
Katie Blackburn 
 
 
 [address] [place] [address] [facility] [address] [address] 
[phone] Patient: Francisco Duncan MRN: [id number] Visit Information Date & Time Provider Department Dept. Phone Encounter #  
 6/12/2018  2:00 PM Jamshid Eric NP [address] [id number] Upcoming Health Maintenance Date Due DTaP/Tdap/Td series (1 - Tdap) 4/7/1997 PAP AKA CERVICAL CYTOLOGY 4/7/1997 Influenza Age 5 to Adult 8/1/2018 Allergies as of 6/12/2018  Review Complete On: 6/12/2018 By: Gina Jauregui Severity Noted Reaction Type Reactions Zofran (As Hydrochloride) [Ondansetron Hcl]  12/05/2017    Anxiety  
 agitation Current Immunizations  Never Reviewed No immunizations on file. Not reviewed this visit Vitals BP Pulse Temp Height(growth percentile) Weight(growth percentile) BMI  
 149/80 78 98.3 °F (36.8 °C) (Oral) 5' 7\" (1.702 m) 266 lb (120.7 kg) 41.66 kg/m2 OB Status Smoking Status Ablation Never Smoker Vitals History BMI and BSA Data Body Mass Index Body Surface Area  
 41.66 kg/m 2 2.39 m 2 Preferred Pharmacy Pharmacy Name Phone [address] [place] [phone] Your Updated Medication List  
  
   
This list is accurate as of 6/12/18  3:29 PM.  Always use your most recent med list. amLODIPine 10 mg tablet Commonly known as:  Tiffanie Rupal Take  by mouth daily. HYZAAR 100-25 mg per tablet Generic drug:  losartan-hydroCHLOROthiazide Take 1 Tab by mouth daily. metoprolol succinate 25 mg XL tablet Commonly known as:  TOPROL-XL Take 25 mg by mouth daily. TOPAMAX PO Take 50 mg by mouth. Introducing [facility]! Dear Phyllis Heart: Thank you for requesting a [facility] account.   Our records indicate that you already have an active [email or web address] account. You can access your account anytime at [email or web address] Did you know that you can access your hospital and ER discharge instructions at any time in [email or web address]? You can also review all of your test results from your hospital stay or ER visit. Additional Information If you have questions, please visit the Frequently Asked Questions section of the [email or web address] website at [email or web address] Remember, [email or web address] is NOT to be used for urgent needs. For medical emergencies, dial 911. Now available from your iPhone and Android! Please provide this summary of care documentation to your next provider. Your primary care clinician is listed as Raquel Guidry. If you have any questions after today's visit, please call [phone].

## 2018-06-13 VITALS
WEIGHT: 266 LBS | DIASTOLIC BLOOD PRESSURE: 88 MMHG | SYSTOLIC BLOOD PRESSURE: 130 MMHG | HEIGHT: 67 IN | BODY MASS INDEX: 41.75 KG/M2 | TEMPERATURE: 98.3 F | HEART RATE: 78 BPM

## 2018-08-27 ENCOUNTER — HOSPITAL ENCOUNTER (OUTPATIENT)
Dept: LAB | Age: 43
Discharge: HOME | DRG: 027 | End: 2018-08-27
Payer: COMMERCIAL

## 2018-08-27 ENCOUNTER — ANESTHESIA EVENT (OUTPATIENT)
Dept: SURGERY | Age: 43
DRG: 027 | End: 2018-08-27
Payer: COMMERCIAL

## 2018-08-27 ENCOUNTER — HOSPITAL ENCOUNTER (OUTPATIENT)
Dept: OTHER | Age: 43
Discharge: HOME | DRG: 027 | End: 2018-08-27
Payer: COMMERCIAL

## 2018-08-27 ENCOUNTER — HOSPITAL ENCOUNTER (OUTPATIENT)
Dept: PREADMISSION TESTING | Age: 43
Discharge: HOME | DRG: 027 | End: 2018-08-27
Payer: COMMERCIAL

## 2018-08-27 DIAGNOSIS — I67.1 CEREBRAL ANEURYSM, NONRUPTURED: ICD-10-CM

## 2018-08-27 DIAGNOSIS — Z01.818 PRE-OP TESTING: ICD-10-CM

## 2018-08-27 LAB
ALBUMIN SERPL-MCNC: 3.8 G/DL (ref 3.4–5)
ALBUMIN/GLOB SERPL: 0.8 {RATIO} (ref 0.8–1.7)
ALP SERPL-CCNC: 106 U/L (ref 45–117)
ALT SERPL-CCNC: 17 U/L (ref 13–56)
ANION GAP SERPL CALC-SCNC: 7 MMOL/L (ref 3–18)
APPEARANCE UR: CLEAR
APTT PPP: 30 SEC (ref 23–36.4)
AST SERPL-CCNC: 12 U/L (ref 15–37)
ATRIAL RATE: 72 BPM
BASOPHILS # BLD: 0 K/UL (ref 0–0.1)
BASOPHILS NFR BLD: 0 % (ref 0–2)
BILIRUB SERPL-MCNC: 0.3 MG/DL (ref 0.2–1)
BILIRUB UR QL: NEGATIVE
BUN SERPL-MCNC: 10 MG/DL (ref 7–18)
BUN/CREAT SERPL: 12 (ref 12–20)
CALCIUM SERPL-MCNC: 9.8 MG/DL (ref 8.5–10.1)
CALCULATED P AXIS, ECG09: 45 DEGREES
CALCULATED R AXIS, ECG10: 51 DEGREES
CALCULATED T AXIS, ECG11: 13 DEGREES
CHLORIDE SERPL-SCNC: 108 MMOL/L (ref 100–108)
CO2 SERPL-SCNC: 32 MMOL/L (ref 21–32)
COLOR UR: YELLOW
CREAT SERPL-MCNC: 0.83 MG/DL (ref 0.6–1.3)
DIAGNOSIS, 93000: NORMAL
DIFFERENTIAL METHOD BLD: ABNORMAL
EOSINOPHIL # BLD: 0.1 K/UL (ref 0–0.4)
EOSINOPHIL NFR BLD: 1 % (ref 0–5)
ERYTHROCYTE [DISTWIDTH] IN BLOOD BY AUTOMATED COUNT: 15 % (ref 11.6–14.5)
GLOBULIN SER CALC-MCNC: 4.7 G/DL (ref 2–4)
GLUCOSE SERPL-MCNC: 97 MG/DL (ref 74–99)
GLUCOSE UR STRIP.AUTO-MCNC: NEGATIVE MG/DL
HCT VFR BLD AUTO: 39.7 % (ref 35–45)
HGB BLD-MCNC: 13.2 G/DL (ref 12–16)
HGB UR QL STRIP: NEGATIVE
INR PPP: 1 (ref 0.8–1.2)
KETONES UR QL STRIP.AUTO: NEGATIVE MG/DL
LEUKOCYTE ESTERASE UR QL STRIP.AUTO: NEGATIVE
LYMPHOCYTES # BLD: 3.9 K/UL (ref 0.9–3.6)
LYMPHOCYTES NFR BLD: 39 % (ref 21–52)
MCH RBC QN AUTO: 32.6 PG (ref 24–34)
MCHC RBC AUTO-ENTMCNC: 33.2 G/DL (ref 31–37)
MCV RBC AUTO: 98 FL (ref 74–97)
MONOCYTES # BLD: 0.9 K/UL (ref 0.05–1.2)
MONOCYTES NFR BLD: 9 % (ref 3–10)
NEUTS SEG # BLD: 5.1 K/UL (ref 1.8–8)
NEUTS SEG NFR BLD: 51 % (ref 40–73)
NITRITE UR QL STRIP.AUTO: NEGATIVE
P-R INTERVAL, ECG05: 188 MS
PH UR STRIP: 7 [PH] (ref 5–8)
PLATELET # BLD AUTO: 427 K/UL (ref 135–420)
PMV BLD AUTO: 9.9 FL (ref 9.2–11.8)
POTASSIUM SERPL-SCNC: 3.7 MMOL/L (ref 3.5–5.5)
PROT SERPL-MCNC: 8.5 G/DL (ref 6.4–8.2)
PROT UR STRIP-MCNC: NEGATIVE MG/DL
PROTHROMBIN TIME: 12.4 SEC (ref 11.5–15.2)
Q-T INTERVAL, ECG07: 394 MS
QRS DURATION, ECG06: 94 MS
QTC CALCULATION (BEZET), ECG08: 431 MS
RBC # BLD AUTO: 4.05 M/UL (ref 4.2–5.3)
SODIUM SERPL-SCNC: 147 MMOL/L (ref 136–145)
SP GR UR REFRACTOMETRY: 1.01 (ref 1–1.03)
UROBILINOGEN UR QL STRIP.AUTO: 0.2 EU/DL (ref 0.2–1)
VENTRICULAR RATE, ECG03: 72 BPM
WBC # BLD AUTO: 10 K/UL (ref 4.6–13.2)

## 2018-08-27 PROCEDURE — 85025 COMPLETE CBC W/AUTO DIFF WBC: CPT | Performed by: NEUROLOGICAL SURGERY

## 2018-08-27 PROCEDURE — 86920 COMPATIBILITY TEST SPIN: CPT | Performed by: NEUROLOGICAL SURGERY

## 2018-08-27 PROCEDURE — 36415 COLL VENOUS BLD VENIPUNCTURE: CPT | Performed by: NEUROLOGICAL SURGERY

## 2018-08-27 PROCEDURE — 86900 BLOOD TYPING SEROLOGIC ABO: CPT | Performed by: NEUROLOGICAL SURGERY

## 2018-08-27 PROCEDURE — 71046 X-RAY EXAM CHEST 2 VIEWS: CPT

## 2018-08-27 PROCEDURE — 87086 URINE CULTURE/COLONY COUNT: CPT | Performed by: NEUROLOGICAL SURGERY

## 2018-08-27 PROCEDURE — 85610 PROTHROMBIN TIME: CPT | Performed by: NEUROLOGICAL SURGERY

## 2018-08-27 PROCEDURE — 81003 URINALYSIS AUTO W/O SCOPE: CPT | Performed by: NEUROLOGICAL SURGERY

## 2018-08-27 PROCEDURE — 93005 ELECTROCARDIOGRAM TRACING: CPT

## 2018-08-27 PROCEDURE — 85730 THROMBOPLASTIN TIME PARTIAL: CPT | Performed by: NEUROLOGICAL SURGERY

## 2018-08-27 PROCEDURE — 80053 COMPREHEN METABOLIC PANEL: CPT | Performed by: NEUROLOGICAL SURGERY

## 2018-08-27 RX ORDER — TOPIRAMATE 50 MG/1
TABLET, FILM COATED ORAL
Refills: 6 | COMMUNITY
Start: 2018-07-13 | End: 2019-05-31

## 2018-08-27 NOTE — PERIOP NOTES
PAT - SURGICAL PRE-ADMISSION INSTRUCTIONS    NAME:  Lady Baker                                                          TODAY'S DATE:  8/27/2018    SURGERY DATE:  8/30/2018                                  SURGERY ARRIVAL TIME:   0630    1. Do NOT eat or drink anything, including candy or gum, after MIDNIGHT on 08/29/2018 , unless you have specific instructions from your Surgeon or Anesthesia Provider to do so. 2. No smoking on the day of surgery. 3. No alcohol 24 hours prior to the day of surgery. 4. No recreational drugs for one week prior to the day of surgery. 5. Leave all valuables, including money/purse, at home. 6. Remove all jewelry, nail polish, makeup (including mascara); no lotions, powders, deodorant, or perfume/cologne/after shave. 7. Glasses/Contact lenses and Dentures may be worn to the hospital.  They will be removed prior to surgery. 8. Call your doctor if symptoms of a cold or illness develop within 24 ours prior to surgery. 9. AN ADULT MUST DRIVE YOU HOME AFTER OUTPATIENT SURGERY. 10. If you are having an OUTPATIENT procedure, please make arrangements for a responsible adult to be with you for 24 hours after your surgery. 11. If you are admitted to the hospital, you will be assigned to a bed after surgery is complete. Normally a family member will not be able to see you until you are in your assigned bed. 15. Family is encouraged to accompany you to the hospital.  We ask visitors in the treatment area to be limited to ONE person at a time to ensure patient privacy. EXCEPTIONS WILL BE MADE AS NEEDED. 15. Children under 12 are discouraged from entering the treatment area and need to be supervised by an adult when in the waiting room. Special Instructions:     Take these medications the morning of surgery with a sip of water:  Metoprolol and Norvasc, Bring any pertinent legal medical records., STOP anticoagulants AT LEAST 1 WEEK PRIOR to your surgery or, follow other MD instructions:  No NSAIDS    Patient Prep:    use CHG solution    These surgical instructions were reviewed with  Farzana Carpenter during the PAT visit. A printed copy of the instructions was provided to Farzana Carpenter. Directions: On the morning of surgery, please go to the [address] [place] [address]. Enter the building from the [facility] entrance, 1st floor (next to the Emergency Room entrance). Take the elevator to the 2nd floor. Sign in at the Registration Desk.     If you have any questions and/or concerns, please do not hesitate to call:  (Prior to the day of surgery)  [facility] unit:  [phone]  (Day of surgery)  [facility] unit:  [phone]

## 2018-08-29 LAB
BACTERIA SPEC CULT: NORMAL
SERVICE CMNT-IMP: NORMAL

## 2018-08-30 ENCOUNTER — ANESTHESIA (OUTPATIENT)
Dept: SURGERY | Age: 43
DRG: 027 | End: 2018-08-30
Payer: COMMERCIAL

## 2018-08-30 ENCOUNTER — HOSPITAL ENCOUNTER (INPATIENT)
Age: 43
LOS: 3 days | Discharge: HOME HEALTH | DRG: 027 | End: 2018-09-02
Attending: NEUROLOGICAL SURGERY | Admitting: NEUROLOGICAL SURGERY
Payer: COMMERCIAL

## 2018-08-30 ENCOUNTER — APPOINTMENT (OUTPATIENT)
Dept: GENERAL RADIOLOGY | Age: 43
DRG: 027 | End: 2018-08-30
Attending: NEUROLOGICAL SURGERY
Payer: COMMERCIAL

## 2018-08-30 DIAGNOSIS — I67.1 ANEURYSM, CEREBRAL: ICD-10-CM

## 2018-08-30 LAB
ALBUMIN SERPL-MCNC: 3.3 G/DL (ref 3.4–5)
ALBUMIN/GLOB SERPL: 0.8 {RATIO} (ref 0.8–1.7)
ALP SERPL-CCNC: 96 U/L (ref 45–117)
ALT SERPL-CCNC: 15 U/L (ref 13–56)
ANION GAP SERPL CALC-SCNC: 11 MMOL/L (ref 3–18)
APPEARANCE UR: CLEAR
AST SERPL-CCNC: 13 U/L (ref 15–37)
BACTERIA URNS QL MICRO: NEGATIVE /HPF
BILIRUB SERPL-MCNC: 0.3 MG/DL (ref 0.2–1)
BILIRUB UR QL: NEGATIVE
BUN SERPL-MCNC: 11 MG/DL (ref 7–18)
BUN/CREAT SERPL: 10 (ref 12–20)
CALCIUM SERPL-MCNC: 8.5 MG/DL (ref 8.5–10.1)
CHLORIDE SERPL-SCNC: 109 MMOL/L (ref 100–108)
CO2 SERPL-SCNC: 23 MMOL/L (ref 21–32)
COLOR UR: YELLOW
CREAT SERPL-MCNC: 1.06 MG/DL (ref 0.6–1.3)
EPITH CASTS URNS QL MICRO: ABNORMAL /LPF (ref 0–5)
GLOBULIN SER CALC-MCNC: 4.3 G/DL (ref 2–4)
GLUCOSE SERPL-MCNC: 182 MG/DL (ref 74–99)
GLUCOSE UR STRIP.AUTO-MCNC: NEGATIVE MG/DL
HCG UR QL: NEGATIVE
HGB UR QL STRIP: NEGATIVE
KETONES UR QL STRIP.AUTO: NEGATIVE MG/DL
LEUKOCYTE ESTERASE UR QL STRIP.AUTO: NEGATIVE
NITRITE UR QL STRIP.AUTO: NEGATIVE
PH UR STRIP: 5.5 [PH] (ref 5–8)
POTASSIUM SERPL-SCNC: 3.6 MMOL/L (ref 3.5–5.5)
PROT SERPL-MCNC: 7.6 G/DL (ref 6.4–8.2)
PROT UR STRIP-MCNC: NEGATIVE MG/DL
RBC #/AREA URNS HPF: ABNORMAL /HPF (ref 0–5)
SODIUM SERPL-SCNC: 143 MMOL/L (ref 136–145)
SP GR UR REFRACTOMETRY: >1.03 (ref 1–1.03)
UROBILINOGEN UR QL STRIP.AUTO: 0.2 EU/DL (ref 0.2–1)
WBC URNS QL MICRO: ABNORMAL /HPF (ref 0–4)

## 2018-08-30 PROCEDURE — 03HY32Z INSERTION OF MONITORING DEVICE INTO UPPER ARTERY, PERCUTANEOUS APPROACH: ICD-10-PCS | Performed by: ANESTHESIOLOGY

## 2018-08-30 PROCEDURE — 74011250636 HC RX REV CODE- 250/636: Performed by: NURSE ANESTHETIST, CERTIFIED REGISTERED

## 2018-08-30 PROCEDURE — 77030004391 HC BUR FLUT MEDT -C: Performed by: NEUROLOGICAL SURGERY

## 2018-08-30 PROCEDURE — 77030018834: Performed by: NEUROLOGICAL SURGERY

## 2018-08-30 PROCEDURE — C1769 GUIDE WIRE: HCPCS | Performed by: PSYCHIATRY & NEUROLOGY

## 2018-08-30 PROCEDURE — 81001 URINALYSIS AUTO W/SCOPE: CPT | Performed by: FAMILY MEDICINE

## 2018-08-30 PROCEDURE — C1713 ANCHOR/SCREW BN/BN,TIS/BN: HCPCS | Performed by: NEUROLOGICAL SURGERY

## 2018-08-30 PROCEDURE — 77030022503 HC BLD MICRO FTHR MITY -B: Performed by: NEUROLOGICAL SURGERY

## 2018-08-30 PROCEDURE — C1729 CATH, DRAINAGE: HCPCS | Performed by: NEUROLOGICAL SURGERY

## 2018-08-30 PROCEDURE — 77030005518 HC CATH URETH FOL 2W BARD -B: Performed by: NEUROLOGICAL SURGERY

## 2018-08-30 PROCEDURE — 76010000181 HC OR TIME 7 TO 7.5 HR INTENSV-TIER 1: Performed by: NEUROLOGICAL SURGERY

## 2018-08-30 PROCEDURE — 77030008477 HC STYL SATN SLP COVD -A: Performed by: NURSE ANESTHETIST, CERTIFIED REGISTERED

## 2018-08-30 PROCEDURE — 77030005401 HC CATH RAD ARRO -A: Performed by: NURSE ANESTHETIST, CERTIFIED REGISTERED

## 2018-08-30 PROCEDURE — 74011000250 HC RX REV CODE- 250: Performed by: NEUROLOGICAL SURGERY

## 2018-08-30 PROCEDURE — 74011250637 HC RX REV CODE- 250/637: Performed by: NEUROLOGICAL SURGERY

## 2018-08-30 PROCEDURE — 77030004472 HC BUR TAPR MEDT -B: Performed by: NEUROLOGICAL SURGERY

## 2018-08-30 PROCEDURE — 77030030722 HC PIN SKULL MAYFLD INLC -B: Performed by: NEUROLOGICAL SURGERY

## 2018-08-30 PROCEDURE — 36223 PLACE CATH CAROTID/INOM ART: CPT | Performed by: PSYCHIATRY & NEUROLOGY

## 2018-08-30 PROCEDURE — 74011250636 HC RX REV CODE- 250/636: Performed by: NEUROLOGICAL SURGERY

## 2018-08-30 PROCEDURE — 77030018390 HC SPNG HEMSTAT2 J&J -B: Performed by: NEUROLOGICAL SURGERY

## 2018-08-30 PROCEDURE — 74011000250 HC RX REV CODE- 250

## 2018-08-30 PROCEDURE — 80053 COMPREHEN METABOLIC PANEL: CPT | Performed by: NEUROLOGICAL SURGERY

## 2018-08-30 PROCEDURE — 74011250637 HC RX REV CODE- 250/637: Performed by: FAMILY MEDICINE

## 2018-08-30 PROCEDURE — 81025 URINE PREGNANCY TEST: CPT

## 2018-08-30 PROCEDURE — 77030008683 HC TU ET CUF COVD -A: Performed by: NURSE ANESTHETIST, CERTIFIED REGISTERED

## 2018-08-30 PROCEDURE — 74011250636 HC RX REV CODE- 250/636: Performed by: PSYCHIATRY & NEUROLOGY

## 2018-08-30 PROCEDURE — 77030003028 HC SUT VCRL J&J -A: Performed by: NEUROLOGICAL SURGERY

## 2018-08-30 PROCEDURE — 77030027853 HC ADPT Y GATEWY PLS BSC -B: Performed by: PSYCHIATRY & NEUROLOGY

## 2018-08-30 PROCEDURE — C1894 INTRO/SHEATH, NON-LASER: HCPCS | Performed by: PSYCHIATRY & NEUROLOGY

## 2018-08-30 PROCEDURE — 77030002916 HC SUT ETHLN J&J -A: Performed by: PSYCHIATRY & NEUROLOGY

## 2018-08-30 PROCEDURE — 77030018673: Performed by: PSYCHIATRY & NEUROLOGY

## 2018-08-30 PROCEDURE — 77030014007 HC SPNG HEMSTAT J&J -B: Performed by: NEUROLOGICAL SURGERY

## 2018-08-30 PROCEDURE — 00U207Z SUPPLEMENT DURA MATER WITH AUTOLOGOUS TISSUE SUBSTITUTE, OPEN APPROACH: ICD-10-PCS | Performed by: NEUROLOGICAL SURGERY

## 2018-08-30 PROCEDURE — C1760 CLOSURE DEV, VASC: HCPCS | Performed by: PSYCHIATRY & NEUROLOGY

## 2018-08-30 PROCEDURE — 77030013079 HC BLNKT BAIR HGGR 3M -A: Performed by: NURSE ANESTHETIST, CERTIFIED REGISTERED

## 2018-08-30 PROCEDURE — 77030021678 HC GLIDESCP STAT DISP VERT -B: Performed by: NURSE ANESTHETIST, CERTIFIED REGISTERED

## 2018-08-30 PROCEDURE — 76937 US GUIDE VASCULAR ACCESS: CPT | Performed by: PSYCHIATRY & NEUROLOGY

## 2018-08-30 PROCEDURE — 77030004565 HC CATH ANGI DX TMPO CARD -B: Performed by: PSYCHIATRY & NEUROLOGY

## 2018-08-30 PROCEDURE — 77030002946 HC SUT NRLN J&J -B: Performed by: NEUROLOGICAL SURGERY

## 2018-08-30 PROCEDURE — 77030037730 HC PTTY BN HEMOSRB ABRY -C: Performed by: NEUROLOGICAL SURGERY

## 2018-08-30 PROCEDURE — 74011250636 HC RX REV CODE- 250/636: Performed by: HOSPITALIST

## 2018-08-30 PROCEDURE — 74011250636 HC RX REV CODE- 250/636

## 2018-08-30 PROCEDURE — 77030008462 HC STPLR SKN PROX J&J -A: Performed by: PSYCHIATRY & NEUROLOGY

## 2018-08-30 PROCEDURE — 74011000250 HC RX REV CODE- 250: Performed by: PSYCHIATRY & NEUROLOGY

## 2018-08-30 PROCEDURE — 77030026918 HC ADMN ST IV BLD BD -A: Performed by: NURSE ANESTHETIST, CERTIFIED REGISTERED

## 2018-08-30 PROCEDURE — 77030018846 HC SOL IRR STRL H20 ICUM -A: Performed by: NEUROLOGICAL SURGERY

## 2018-08-30 PROCEDURE — 65610000009 HC RM ICU NEURO

## 2018-08-30 PROCEDURE — 76060000045 HC ANESTHESIA 7 TO 7.5 HR: Performed by: NEUROLOGICAL SURGERY

## 2018-08-30 PROCEDURE — 77030026016 HC SEAL HEMSTAT SRGFL J&J -B: Performed by: NEUROLOGICAL SURGERY

## 2018-08-30 PROCEDURE — 77030013797 HC KT TRNSDUC PRSSR EDWD -A: Performed by: NURSE ANESTHETIST, CERTIFIED REGISTERED

## 2018-08-30 PROCEDURE — 77030012348 HC CLP ANEUR YASAR AESC -E: Performed by: NEUROLOGICAL SURGERY

## 2018-08-30 PROCEDURE — 74011000272 HC RX REV CODE- 272: Performed by: NEUROLOGICAL SURGERY

## 2018-08-30 PROCEDURE — C1763 CONN TISS, NON-HUMAN: HCPCS | Performed by: NEUROLOGICAL SURGERY

## 2018-08-30 PROCEDURE — 77030037878 HC DRSG MEPILEX >48IN BORD MOLN -B

## 2018-08-30 PROCEDURE — 77030019908 HC STETH ESOPH SIMS -A: Performed by: NURSE ANESTHETIST, CERTIFIED REGISTERED

## 2018-08-30 PROCEDURE — 03LG0CZ OCCLUSION OF INTRACRANIAL ARTERY WITH EXTRALUMINAL DEVICE, OPEN APPROACH: ICD-10-PCS | Performed by: NEUROLOGICAL SURGERY

## 2018-08-30 PROCEDURE — 74011250637 HC RX REV CODE- 250/637: Performed by: NURSE ANESTHETIST, CERTIFIED REGISTERED

## 2018-08-30 PROCEDURE — 74011636320 HC RX REV CODE- 636/320: Performed by: PSYCHIATRY & NEUROLOGY

## 2018-08-30 PROCEDURE — 77030008584 HC TOOL GDWRE DEV TERU -A: Performed by: PSYCHIATRY & NEUROLOGY

## 2018-08-30 PROCEDURE — 77030008462 HC STPLR SKN PROX J&J -A: Performed by: NEUROLOGICAL SURGERY

## 2018-08-30 PROCEDURE — 77010033678 HC OXYGEN DAILY

## 2018-08-30 DEVICE — DURA 62106 SUBSTITUTE DUREPAIR 1X3IN NCE
Type: IMPLANTABLE DEVICE | Site: BRAIN | Status: FUNCTIONAL
Brand: DUREPAIR®

## 2018-08-30 DEVICE — PLATE, RIGID
Type: IMPLANTABLE DEVICE | Site: SKULL | Status: FUNCTIONAL
Brand: UNIVERSAL NEURO 2, QUIKFLAP

## 2018-08-30 DEVICE — 5CC HYDROSET INJECTABLE CEMENT
Type: IMPLANTABLE DEVICE | Site: BRAIN | Status: FUNCTIONAL
Brand: HYDROSET

## 2018-08-30 DEVICE — AGENT HEMSTAT 2GM ABSRB SYNTH BONE PUTTY W/ SPAT: Type: IMPLANTABLE DEVICE | Site: CRANIAL | Status: FUNCTIONAL

## 2018-08-30 RX ORDER — DOCUSATE SODIUM 100 MG/1
100 CAPSULE, LIQUID FILLED ORAL 2 TIMES DAILY
Status: DISCONTINUED | OUTPATIENT
Start: 2018-08-30 | End: 2018-09-02 | Stop reason: HOSPADM

## 2018-08-30 RX ORDER — HYDROCODONE BITARTRATE AND ACETAMINOPHEN 5; 325 MG/1; MG/1
1 TABLET ORAL
Status: DISCONTINUED | OUTPATIENT
Start: 2018-08-30 | End: 2018-09-02 | Stop reason: HOSPADM

## 2018-08-30 RX ORDER — DEXAMETHASONE SODIUM PHOSPHATE 4 MG/ML
2 INJECTION, SOLUTION INTRA-ARTICULAR; INTRALESIONAL; INTRAMUSCULAR; INTRAVENOUS; SOFT TISSUE EVERY 8 HOURS
Status: DISCONTINUED | OUTPATIENT
Start: 2018-09-04 | End: 2018-09-02 | Stop reason: HOSPADM

## 2018-08-30 RX ORDER — METOPROLOL TARTRATE 5 MG/5ML
2.5 INJECTION INTRAVENOUS
Status: DISCONTINUED | OUTPATIENT
Start: 2018-08-30 | End: 2018-09-02 | Stop reason: HOSPADM

## 2018-08-30 RX ORDER — METOPROLOL SUCCINATE 25 MG/1
25 TABLET, EXTENDED RELEASE ORAL DAILY
Status: DISCONTINUED | OUTPATIENT
Start: 2018-08-31 | End: 2018-08-31

## 2018-08-30 RX ORDER — SCOLOPAMINE TRANSDERMAL SYSTEM 1 MG/1
1 PATCH, EXTENDED RELEASE TRANSDERMAL
Status: DISCONTINUED | OUTPATIENT
Start: 2018-08-30 | End: 2018-08-30

## 2018-08-30 RX ORDER — DEXAMETHASONE SODIUM PHOSPHATE 4 MG/ML
2 INJECTION, SOLUTION INTRA-ARTICULAR; INTRALESIONAL; INTRAMUSCULAR; INTRAVENOUS; SOFT TISSUE EVERY 12 HOURS
Status: DISCONTINUED | OUTPATIENT
Start: 2018-09-05 | End: 2018-09-02 | Stop reason: HOSPADM

## 2018-08-30 RX ORDER — SODIUM CHLORIDE 9 MG/ML
50 INJECTION, SOLUTION INTRAVENOUS CONTINUOUS
Status: DISCONTINUED | OUTPATIENT
Start: 2018-08-30 | End: 2018-08-30

## 2018-08-30 RX ORDER — CEFAZOLIN SODIUM 1 G/3ML
INJECTION, POWDER, FOR SOLUTION INTRAMUSCULAR; INTRAVENOUS AS NEEDED
Status: DISCONTINUED | OUTPATIENT
Start: 2018-08-30 | End: 2018-08-30 | Stop reason: HOSPADM

## 2018-08-30 RX ORDER — VECURONIUM BROMIDE FOR INJECTION 1 MG/ML
INJECTION, POWDER, LYOPHILIZED, FOR SOLUTION INTRAVENOUS AS NEEDED
Status: DISCONTINUED | OUTPATIENT
Start: 2018-08-30 | End: 2018-08-30 | Stop reason: HOSPADM

## 2018-08-30 RX ORDER — PROPOFOL 10 MG/ML
INJECTION, EMULSION INTRAVENOUS
Status: DISCONTINUED | OUTPATIENT
Start: 2018-08-30 | End: 2018-08-30 | Stop reason: HOSPADM

## 2018-08-30 RX ORDER — PROMETHAZINE HYDROCHLORIDE 25 MG/ML
INJECTION, SOLUTION INTRAMUSCULAR; INTRAVENOUS AS NEEDED
Status: DISCONTINUED | OUTPATIENT
Start: 2018-08-30 | End: 2018-08-30 | Stop reason: HOSPADM

## 2018-08-30 RX ORDER — NICARDIPINE HYDROCHLORIDE 0.1 MG/ML
INJECTION INTRAVENOUS
Status: DISCONTINUED | OUTPATIENT
Start: 2018-08-30 | End: 2018-08-30 | Stop reason: HOSPADM

## 2018-08-30 RX ORDER — DEXAMETHASONE SODIUM PHOSPHATE 4 MG/ML
2 INJECTION, SOLUTION INTRA-ARTICULAR; INTRALESIONAL; INTRAMUSCULAR; INTRAVENOUS; SOFT TISSUE EVERY 6 HOURS
Status: DISCONTINUED | OUTPATIENT
Start: 2018-09-03 | End: 2018-09-02 | Stop reason: HOSPADM

## 2018-08-30 RX ORDER — PAPAVERINE HYDROCHLORIDE 30 MG/ML
INJECTION INTRAMUSCULAR; INTRAVENOUS AS NEEDED
Status: DISCONTINUED | OUTPATIENT
Start: 2018-08-30 | End: 2018-08-30

## 2018-08-30 RX ORDER — DEXAMETHASONE SODIUM PHOSPHATE 4 MG/ML
4 INJECTION, SOLUTION INTRA-ARTICULAR; INTRALESIONAL; INTRAMUSCULAR; INTRAVENOUS; SOFT TISSUE EVERY 6 HOURS
Status: COMPLETED | OUTPATIENT
Start: 2018-08-30 | End: 2018-09-02

## 2018-08-30 RX ORDER — CEFAZOLIN SODIUM 2 G/50ML
2 SOLUTION INTRAVENOUS EVERY 8 HOURS
Status: COMPLETED | OUTPATIENT
Start: 2018-08-30 | End: 2018-08-31

## 2018-08-30 RX ORDER — SODIUM CHLORIDE AND POTASSIUM CHLORIDE .9; .15 G/100ML; G/100ML
SOLUTION INTRAVENOUS CONTINUOUS
Status: DISCONTINUED | OUTPATIENT
Start: 2018-08-30 | End: 2018-09-02

## 2018-08-30 RX ORDER — FUROSEMIDE 10 MG/ML
INJECTION INTRAMUSCULAR; INTRAVENOUS AS NEEDED
Status: DISCONTINUED | OUTPATIENT
Start: 2018-08-30 | End: 2018-08-30 | Stop reason: HOSPADM

## 2018-08-30 RX ORDER — SODIUM CHLORIDE 9 MG/ML
INJECTION, SOLUTION INTRAVENOUS
Status: DISCONTINUED | OUTPATIENT
Start: 2018-08-30 | End: 2018-08-30 | Stop reason: HOSPADM

## 2018-08-30 RX ORDER — MORPHINE SULFATE 2 MG/ML
2-4 INJECTION, SOLUTION INTRAMUSCULAR; INTRAVENOUS
Status: DISCONTINUED | OUTPATIENT
Start: 2018-08-30 | End: 2018-08-30

## 2018-08-30 RX ORDER — LABETALOL HCL 20 MG/4 ML
SYRINGE (ML) INTRAVENOUS AS NEEDED
Status: DISCONTINUED | OUTPATIENT
Start: 2018-08-30 | End: 2018-08-30 | Stop reason: HOSPADM

## 2018-08-30 RX ORDER — LIDOCAINE HYDROCHLORIDE 20 MG/ML
INJECTION, SOLUTION EPIDURAL; INFILTRATION; INTRACAUDAL; PERINEURAL AS NEEDED
Status: DISCONTINUED | OUTPATIENT
Start: 2018-08-30 | End: 2018-08-30 | Stop reason: HOSPADM

## 2018-08-30 RX ORDER — NICARDIPINE HYDROCHLORIDE 0.1 MG/ML
0-15 INJECTION INTRAVENOUS
Status: DISCONTINUED | OUTPATIENT
Start: 2018-08-30 | End: 2018-08-30

## 2018-08-30 RX ORDER — FAMOTIDINE 20 MG/1
20 TABLET, FILM COATED ORAL ONCE
Status: COMPLETED | OUTPATIENT
Start: 2018-08-30 | End: 2018-08-30

## 2018-08-30 RX ORDER — SODIUM CHLORIDE 9 MG/ML
250 INJECTION, SOLUTION INTRAVENOUS AS NEEDED
Status: DISCONTINUED | OUTPATIENT
Start: 2018-08-30 | End: 2018-08-30

## 2018-08-30 RX ORDER — HYDROMORPHONE HYDROCHLORIDE 1 MG/ML
INJECTION, SOLUTION INTRAMUSCULAR; INTRAVENOUS; SUBCUTANEOUS AS NEEDED
Status: DISCONTINUED | OUTPATIENT
Start: 2018-08-30 | End: 2018-08-30 | Stop reason: HOSPADM

## 2018-08-30 RX ORDER — ONDANSETRON 2 MG/ML
4 INJECTION INTRAMUSCULAR; INTRAVENOUS
Status: DISCONTINUED | OUTPATIENT
Start: 2018-08-30 | End: 2018-09-02 | Stop reason: HOSPADM

## 2018-08-30 RX ORDER — BISACODYL 5 MG
10 TABLET, DELAYED RELEASE (ENTERIC COATED) ORAL DAILY PRN
Status: DISCONTINUED | OUTPATIENT
Start: 2018-08-30 | End: 2018-09-02 | Stop reason: HOSPADM

## 2018-08-30 RX ORDER — MIDAZOLAM HYDROCHLORIDE 1 MG/ML
INJECTION, SOLUTION INTRAMUSCULAR; INTRAVENOUS AS NEEDED
Status: DISCONTINUED | OUTPATIENT
Start: 2018-08-30 | End: 2018-08-30 | Stop reason: HOSPADM

## 2018-08-30 RX ORDER — VERAPAMIL HYDROCHLORIDE 2.5 MG/ML
INJECTION, SOLUTION INTRAVENOUS AS NEEDED
Status: DISCONTINUED | OUTPATIENT
Start: 2018-08-30 | End: 2018-08-30 | Stop reason: HOSPADM

## 2018-08-30 RX ORDER — MORPHINE SULFATE 2 MG/ML
2-4 INJECTION, SOLUTION INTRAMUSCULAR; INTRAVENOUS
Status: DISCONTINUED | OUTPATIENT
Start: 2018-08-30 | End: 2018-09-02 | Stop reason: HOSPADM

## 2018-08-30 RX ORDER — LOSARTAN POTASSIUM 50 MG/1
100 TABLET ORAL DAILY
Status: DISCONTINUED | OUTPATIENT
Start: 2018-08-31 | End: 2018-09-02 | Stop reason: HOSPADM

## 2018-08-30 RX ORDER — AMLODIPINE BESYLATE 10 MG/1
10 TABLET ORAL DAILY
Status: DISCONTINUED | OUTPATIENT
Start: 2018-08-31 | End: 2018-09-02 | Stop reason: HOSPADM

## 2018-08-30 RX ORDER — TOPIRAMATE 25 MG/1
50 TABLET ORAL DAILY
Status: DISCONTINUED | OUTPATIENT
Start: 2018-08-31 | End: 2018-08-31

## 2018-08-30 RX ORDER — LIDOCAINE HYDROCHLORIDE AND EPINEPHRINE 10; 10 MG/ML; UG/ML
INJECTION, SOLUTION INFILTRATION; PERINEURAL AS NEEDED
Status: DISCONTINUED | OUTPATIENT
Start: 2018-08-30 | End: 2018-08-30

## 2018-08-30 RX ORDER — LABETALOL HYDROCHLORIDE 5 MG/ML
10-20 INJECTION, SOLUTION INTRAVENOUS
Status: DISCONTINUED | OUTPATIENT
Start: 2018-08-30 | End: 2018-09-02 | Stop reason: HOSPADM

## 2018-08-30 RX ORDER — CEFAZOLIN SODIUM 2 G/50ML
2 SOLUTION INTRAVENOUS
Status: COMPLETED | OUTPATIENT
Start: 2018-08-30 | End: 2018-08-30

## 2018-08-30 RX ORDER — PROPOFOL 10 MG/ML
INJECTION, EMULSION INTRAVENOUS AS NEEDED
Status: DISCONTINUED | OUTPATIENT
Start: 2018-08-30 | End: 2018-08-30 | Stop reason: HOSPADM

## 2018-08-30 RX ORDER — HYDROCHLOROTHIAZIDE 25 MG/1
25 TABLET ORAL DAILY
Status: DISCONTINUED | OUTPATIENT
Start: 2018-08-31 | End: 2018-09-02 | Stop reason: HOSPADM

## 2018-08-30 RX ORDER — VERAPAMIL HYDROCHLORIDE 2.5 MG/ML
INJECTION, SOLUTION INTRAVENOUS
Status: DISCONTINUED
Start: 2018-08-30 | End: 2018-08-30

## 2018-08-30 RX ORDER — VANCOMYCIN HYDROCHLORIDE 1 G/20ML
INJECTION, POWDER, LYOPHILIZED, FOR SOLUTION INTRAVENOUS AS NEEDED
Status: DISCONTINUED | OUTPATIENT
Start: 2018-08-30 | End: 2018-08-30

## 2018-08-30 RX ORDER — FENTANYL CITRATE 50 UG/ML
INJECTION, SOLUTION INTRAMUSCULAR; INTRAVENOUS AS NEEDED
Status: DISCONTINUED | OUTPATIENT
Start: 2018-08-30 | End: 2018-08-30 | Stop reason: HOSPADM

## 2018-08-30 RX ORDER — DEXAMETHASONE SODIUM PHOSPHATE 4 MG/ML
4 INJECTION, SOLUTION INTRA-ARTICULAR; INTRALESIONAL; INTRAMUSCULAR; INTRAVENOUS; SOFT TISSUE EVERY 8 HOURS
Status: DISCONTINUED | OUTPATIENT
Start: 2018-09-02 | End: 2018-09-02 | Stop reason: HOSPADM

## 2018-08-30 RX ORDER — INSULIN LISPRO 100 [IU]/ML
INJECTION, SOLUTION INTRAVENOUS; SUBCUTANEOUS ONCE
Status: DISCONTINUED | OUTPATIENT
Start: 2018-08-30 | End: 2018-08-30

## 2018-08-30 RX ORDER — ACETAMINOPHEN 325 MG/1
650 TABLET ORAL
Status: DISCONTINUED | OUTPATIENT
Start: 2018-08-30 | End: 2018-09-02 | Stop reason: HOSPADM

## 2018-08-30 RX ORDER — DEXAMETHASONE SODIUM PHOSPHATE 4 MG/ML
INJECTION, SOLUTION INTRA-ARTICULAR; INTRALESIONAL; INTRAMUSCULAR; INTRAVENOUS; SOFT TISSUE AS NEEDED
Status: DISCONTINUED | OUTPATIENT
Start: 2018-08-30 | End: 2018-08-30 | Stop reason: HOSPADM

## 2018-08-30 RX ORDER — MANNITOL 20 G/100ML
INJECTION, SOLUTION INTRAVENOUS
Status: DISCONTINUED | OUTPATIENT
Start: 2018-08-30 | End: 2018-08-30 | Stop reason: HOSPADM

## 2018-08-30 RX ADMIN — HYDROMORPHONE HYDROCHLORIDE 1 MG: 1 INJECTION, SOLUTION INTRAMUSCULAR; INTRAVENOUS; SUBCUTANEOUS at 08:32

## 2018-08-30 RX ADMIN — DOCUSATE SODIUM 100 MG: 100 CAPSULE, LIQUID FILLED ORAL at 22:26

## 2018-08-30 RX ADMIN — VECURONIUM BROMIDE FOR INJECTION 6 MG: 1 INJECTION, POWDER, LYOPHILIZED, FOR SOLUTION INTRAVENOUS at 08:41

## 2018-08-30 RX ADMIN — SODIUM CHLORIDE AND POTASSIUM CHLORIDE 75 ML/HR: 9; 1.49 INJECTION, SOLUTION INTRAVENOUS at 17:31

## 2018-08-30 RX ADMIN — PROMETHAZINE HYDROCHLORIDE 12.5 MG: 25 INJECTION, SOLUTION INTRAMUSCULAR; INTRAVENOUS at 09:24

## 2018-08-30 RX ADMIN — MIDAZOLAM HYDROCHLORIDE 2 MG: 1 INJECTION, SOLUTION INTRAMUSCULAR; INTRAVENOUS at 08:32

## 2018-08-30 RX ADMIN — PROPOFOL 75 MCG/KG/MIN: 10 INJECTION, EMULSION INTRAVENOUS at 09:04

## 2018-08-30 RX ADMIN — SODIUM CHLORIDE: 9 INJECTION, SOLUTION INTRAVENOUS at 08:48

## 2018-08-30 RX ADMIN — DEXAMETHASONE SODIUM PHOSPHATE 10 MG: 4 INJECTION, SOLUTION INTRA-ARTICULAR; INTRALESIONAL; INTRAMUSCULAR; INTRAVENOUS; SOFT TISSUE at 09:23

## 2018-08-30 RX ADMIN — FUROSEMIDE 20 MG: 10 INJECTION INTRAMUSCULAR; INTRAVENOUS at 08:57

## 2018-08-30 RX ADMIN — MORPHINE SULFATE 2 MG: 2 INJECTION, SOLUTION INTRAMUSCULAR; INTRAVENOUS at 23:08

## 2018-08-30 RX ADMIN — NICARDIPINE HYDROCHLORIDE 5 MG/HR: 0.1 INJECTION INTRAVENOUS at 15:21

## 2018-08-30 RX ADMIN — CEFAZOLIN SODIUM 2 G: 2 SOLUTION INTRAVENOUS at 17:26

## 2018-08-30 RX ADMIN — PROPOFOL 30 MG: 10 INJECTION, EMULSION INTRAVENOUS at 09:29

## 2018-08-30 RX ADMIN — PROPOFOL 200 MG: 10 INJECTION, EMULSION INTRAVENOUS at 08:41

## 2018-08-30 RX ADMIN — CEFAZOLIN SODIUM 2 G: 2 SOLUTION INTRAVENOUS at 09:15

## 2018-08-30 RX ADMIN — PROPOFOL 70 MG: 10 INJECTION, EMULSION INTRAVENOUS at 09:28

## 2018-08-30 RX ADMIN — Medication 10 MG: at 08:53

## 2018-08-30 RX ADMIN — SODIUM CHLORIDE 50 ML/HR: 900 INJECTION, SOLUTION INTRAVENOUS at 08:15

## 2018-08-30 RX ADMIN — LIDOCAINE HYDROCHLORIDE 100 MG: 20 INJECTION, SOLUTION EPIDURAL; INFILTRATION; INTRACAUDAL; PERINEURAL at 08:41

## 2018-08-30 RX ADMIN — DEXAMETHASONE SODIUM PHOSPHATE 4 MG: 4 INJECTION, SOLUTION INTRAMUSCULAR; INTRAVENOUS at 17:27

## 2018-08-30 RX ADMIN — SODIUM CHLORIDE: 900 INJECTION, SOLUTION INTRAVENOUS at 12:04

## 2018-08-30 RX ADMIN — HYDROMORPHONE HYDROCHLORIDE 0.5 MG: 1 INJECTION, SOLUTION INTRAMUSCULAR; INTRAVENOUS; SUBCUTANEOUS at 11:31

## 2018-08-30 RX ADMIN — HYDROCODONE BITARTRATE AND ACETAMINOPHEN 1 TABLET: 5; 325 TABLET ORAL at 22:30

## 2018-08-30 RX ADMIN — FAMOTIDINE 20 MG: 10 INJECTION, SOLUTION INTRAVENOUS at 21:08

## 2018-08-30 RX ADMIN — CEFAZOLIN SODIUM 2 G: 1 INJECTION, POWDER, FOR SOLUTION INTRAMUSCULAR; INTRAVENOUS at 13:36

## 2018-08-30 RX ADMIN — MORPHINE SULFATE 2 MG: 2 INJECTION, SOLUTION INTRAMUSCULAR; INTRAVENOUS at 21:08

## 2018-08-30 RX ADMIN — FAMOTIDINE 20 MG: 20 TABLET ORAL at 07:58

## 2018-08-30 RX ADMIN — FENTANYL CITRATE 100 MCG: 50 INJECTION, SOLUTION INTRAMUSCULAR; INTRAVENOUS at 08:41

## 2018-08-30 RX ADMIN — DEXAMETHASONE SODIUM PHOSPHATE 4 MG: 4 INJECTION, SOLUTION INTRAMUSCULAR; INTRAVENOUS at 23:08

## 2018-08-30 RX ADMIN — MANNITOL: 20 INJECTION, SOLUTION INTRAVENOUS at 10:09

## 2018-08-30 NOTE — BRIEF OP NOTE
BRIEF OPERATIVE NOTE    Date of Procedure: 8/30/2018   Preoperative Diagnosis: aneurysm  i67.1  Postoperative Diagnosis: aneurysm  i67.1    Procedure(s):  Image guided right frontal CRANIOTOMY FOR clipping of pericallosal artery  ANEURYSM  Surgeon(s) and Role:     * Ramez Gallo MD - Primary         Surgical Assistant: Cris Velasquez    Surgical Staff:  Circ-1: Alpesh Stokes RN  Circ-2: Kevin Paniagua RN  Circ-Relief: Chris Gilliland; Shaina Delcid  Scrub Tech-1: Harpal Spencer  Scrub Tech-Relief: Kat Fox  Surg Asst-1: James Neither  Surg Asst-2: Michaelcastillo Riojas; Kami Frank  Event Time In   Incision Start 9803   Incision Close 1511     Anesthesia: General   Estimated Blood Loss: 50 ml  Specimens: * No specimens in log *   Findings: -   Complications: --  Implants:   Implant Name Type Inv.  Item Serial No.  Lot No. LRB No. Used Action   PUTTY BNE HEMOSTAT SM 2GR -- HEMOSORB W/SPATULA - [id number]  PUTTY BNE HEMOSTAT SM 2GR -- HEMOSORB W/SPATULA  [id number] Right 1 Implanted   GRAFT DURA SUB DUREPAIR 1X3IN --  - [id number]  GRAFT DURA SUB DUREPAIR 1X3IN --   [employer] [id number] Right 1 Implanted   HYDROSET 5CC (HYDROXYAPATITE) - [id number] Bone HYDROSET 5CC (HYDROXYAPATITE)  DEBORAH KIMBERLEY [id number] Right 1 Implanted   Deborah AXS Screw  Screw  [id number] [employer] [facility] [id number] Right 1 Implanted   deborah 5mm clip Clip  [id number] DEBORAH NEUROVASCULAR  Right 1 Implanted   DEBORAH 3MM CLIP Clip  [id number] DEBORAH NEUROVASCULAR  Right 1 Implanted   DEBORAH 6.5 CLIP Clip  [id number] DEBORAH NEUROVASCULAR  Right 1 Implanted   PLATE BNE XRIGID 5.9UO 2H 12MM --  - [id number]  PLATE BNE XRIGID 5.0IL 2H 12MM --   DEBORAH CRANIOMAXILLOFACIAL [id number] N/A 5 Implanted   axs srews 1.5x4mm Screw     DEBORAH NEUROVASCULAR [id number] N/A 10 Implanted

## 2018-08-30 NOTE — H&P
History and Physical reviewed; I have examined the patient and there are no pertinent changes.     Norma Case MD   8:20 AM 8/30/2018

## 2018-08-30 NOTE — ANESTHESIA POSTPROCEDURE EVALUATION
Post-Anesthesia Evaluation and Assessment    Patient: Bettie Dailey MRN: [id number]  SSN: [id number]    YOB: 1975  Age: 44 y.o. Sex: female      Data from PACU flowsheet    Cardiovascular Function/Vital Signs  Visit Vitals    /63    Pulse 96    Temp 37.1 °C (98.7 °F)    Resp 25    Ht 5' 7.5\" (1.715 m)    Wt 116.6 kg (257 lb 2 oz)    SpO2 96%    BMI 39.68 kg/m2       Patient is status post general anesthesia for Procedure(s):  Image guided right frontal CRANIOTOMY FOR clipping of pericallosal artery  ANEURYSM. Nausea/Vomiting: controlled    Postoperative hydration reviewed and adequate. Pain:  Pain Scale 1: Numeric (0 - 10) (08/30/18 0737)  Pain Intensity 1: 0 (08/30/18 0737)   Managed      Mental Status and Level of Consciousness: Alert and oriented     Pulmonary Status:   O2 Device: Nasal cannula (08/30/18 1544)   Adequate oxygenation and airway patent    Complications related to anesthesia: None    Post-anesthesia assessment completed.  No concerns    Signed By: Keri Ponce MD     August 30, 2018

## 2018-08-30 NOTE — IP AVS SNAPSHOT
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 
[phone] Discharge Orders Procedure Order Date Status Priority Quantity Spec Type Associated Dx NURSING-MISCELLANEOUS: call office to make 9/10 follow up 09/02/18 1847 Normal Routine 1  Aneurysm, cerebral [[id number]] Questions: Description of Order:  call office to make 9/10 follow up NURSING-MISCELLANEOUS: please arrange RW for home 09/02/18 1847 Normal Routine 1  Aneurysm, cerebral [[id number]] Questions: Description of Order:  please arrange RW for home A check angelica indicates which time of day the medication should be taken. My Medications START taking these medications Instructions Each Dose to Equal  
 Morning Noon Evening Bedtime HYDROcodone-acetaminophen 5-325 mg per tablet Commonly known as:  Hawk Perez Your last dose was: Your next dose is: Take 1-2 Tabs by mouth every six (6) hours as needed. Max Daily Amount: 8 Tabs. 1-2 Tab  
    
   
   
   
  
 methylPREDNISolone 4 mg tablet Commonly known as:  Yen Kemp Your last dose was: Your next dose is: As directed. CONTINUE taking these medications Instructions Each Dose to Equal  
 Morning Noon Evening Bedtime amLODIPine 10 mg tablet Commonly known as:  Woods Vanessaer Your last dose was: Your next dose is: Take  by mouth daily. HYZAAR 100-25 mg per tablet Generic drug:  losartan-hydroCHLOROthiazide Your last dose was: Your next dose is: Take 1 Tab by mouth daily. 1 Tab  
    
   
   
   
  
 metoprolol succinate 25 mg XL tablet Commonly known as:  TOPROL-XL Your last dose was: Your next dose is: Take 25 mg by mouth daily. 25 mg  
    
   
   
   
  
 topiramate 50 mg tablet Commonly known as:  TOPAMAX Your last dose was: Your next dose is:    
   
   
      
   
   
   
  
  
  
Where to Get Your Medications These medications were sent to [address] Phone:  [phone]  
  methylPREDNISolone 4 mg tablet Information on where to get these meds will be given to you by the nurse or doctor. ! Ask your nurse or doctor about these medications HYDROcodone-acetaminophen 5-325 mg per tablet Opioid Education Prescription Opioids: What You Need to Know: 
 
 
 
F-face looks uneven A-arms unable to move or move unevenly S-speech slurred or non-existent T-time-call 911 as soon as signs and symptoms begin-DO NOT go Back to bed or wait to see if you get better-TIME IS BRAIN. Warning Signs of HEART ATTACK Call 911 if you have these symptoms: 
? Chest discomfort. Most heart attacks involve discomfort in the center of the chest that lasts more than a few minutes, or that goes away and comes back. It can feel like uncomfortable pressure, squeezing, fullness, or pain. ? Discomfort in other areas of the upper body. Symptoms can include pain or discomfort in one or both arms, the back, neck, jaw, or stomach. ? Shortness of breath with or without chest discomfort. ? Other signs may include breaking out in a cold sweat, nausea, or lightheadedness. Don't wait more than five minutes to call [address]! Fast action can save your life. Calling 911 is almost always the fastest way to get lifesaving treatment. Emergency Medical Services staff can begin treatment when they arrive  up to an hour sooner than if someone gets to the hospital by car. The discharge information has been reviewed with the patient. The patient verbalized understanding. Discharge medications reviewed with the patient and appropriate educational materials and side effects teaching were provided. ___________________________________________________________________________________________________________________________________ Discharge Instructions: With your recent surgery it is not unusual to experience some pain or discomfort in the area of previous pain or the incision.  Accordingly, you have been given pain medication for your comfort until the healing process is further along.  As time progresses, you should notice the frequency and the intensity of your discomfort steadily decrease.  Here are some simple post-operative instructions to help during your home convalescence.  
 
Use your pain medication as directed.  Refills should be requested during regular office hours and not on weekends by calling [phone] x [phone]. Continue any regular medicine for other conditions (e.g. blood pressure, diabetes, heart, heart problems, etc.) You may ride in a car for short trips.  Dr. Ginnie Schlatter will discuss with you when you can drive. No bending, lifting or strenuous activities.  If you need to get to the floor, squat instead of bending. Showers are fine and you may wash your hair. Avoid exercises except for walking.  Begin with frequent, but short, periods of walking and gradually build up to longer periods of time. Sit for short periods of time (10-15 minutes) in the first week after surgery. You may resume sexual relations as comfort level allows. Notify us if you develop fever, painful redness around the incision or drainage from the wound. Call and schedule your follow-up appointment with your doctor at [phone] x [phone]    If you have any questions, please contact our office at [phone] Matchup Announcement We are excited to announce that we are making your provider's discharge notes available to you in Matchup. You will see these notes when they are completed and signed by the physician that discharged you from your recent hospital stay. If you have any questions or concerns about any information you see in Matchup, please call the Health Information Department where you were seen or reach out to your Primary Care Provider for more information about your plan of care. Introducing [facility]! Dear Asad Kumar: Thank you for requesting a Matchup account. Our records indicate that you already have an active Matchup account. You can access your account anytime at [email or web address] Did you know that you can access your hospital and ER discharge instructions at any time in Matchup? You can also review all of your test results from your hospital stay or ER visit. Additional Information If you have questions, please visit the Frequently Asked Questions section of the MyChart website at [email or web address] Remember, StyleSharehart is NOT to be used for urgent needs. For medical emergencies, dial 911. Now available from your iPhone and Android! Introducing August Gomez As a [employer] patient, I wanted to make you aware of our electronic visit tool called August Gomez. [employer] 24/7 allows you to connect within minutes with a medical provider 24 hours a day, seven days a week via a mobile device or tablet or logging into a secure website from your computer. You can access August Gomez from anywhere in the [place]. A virtual visit might be right for you when you have a simple condition and feel like you just dont want to get out of bed, or cant get away from work for an appointment, when your regular [employer] provider is not available (evenings, weekends or holidays), or when youre out of town and need minor care. Electronic visits cost only $49 and if the [employer] 24/7 provider determines a prescription is needed to treat your condition, one can be electronically transmitted to a nearby pharmacy*. Please take a moment to enroll today if you have not already done so. The enrollment process is free and takes just a few minutes. To enroll, please download the [employer] 24/7 robert to your tablet or phone, or visit [email or web address] to enroll on your computer. And, as an [address] [place] [address] patient with a [employer] account, the results of your visits will be scanned into your electronic medical record and your primary care provider will be able to view the scanned results. We urge you to continue to see your regular [employer] provider for your ongoing medical care.   And while your primary care provider may not be the one available when you seek a August Pabonjesus virtual visit, the peace of mind you get from getting a real diagnosis real time can be priceless. For more information on myQaavaleriefin, view our Frequently Asked Questions (FAQs) at [email or web address]. Sincerely, 
 
Georgina Parekh MD 
Chief Medical Officer 50Kesha Reyes *:  certain medications cannot be prescribed via myQaavalerieTaglocity Unresulted Labs-Please follow up with your PCP about these lab tests Order Current Status URINALYSIS W/MICROSCOPIC Preliminary result Providers Seen During Your Hospitalization Provider Specialty Primary office phone Randal Banks MD Neurosurgery [phone] Your Primary Care Physician (PCP) Primary Care Physician Office Phone Office Fax [place] [phone] You are allergic to the following Allergen Reactions Zofran (As Hydrochloride) (Ondansetron Hcl) Anxiety  
 agitation Recent Documentation Height Weight BMI OB Status Smoking Status 1.715 m 116.6 kg 39.68 kg/m2 Ablation Never Smoker Emergency Contacts Name Discharge Info Relation Home Work Mobile [facility] CAREGIVER [3] Daughter [21]   [phone] [facility] DISCHARGE CAREGIVER [3] Friend [5] [phone] Patient Belongings The following personal items are in your possession at time of discharge: 
  Dental Appliances: None         Home Medications: None   Jewelry: None  Clothing: Undergarments, Pants, Shirt, Footwear    Other Valuables: Cell Phone Please provide this summary of care documentation to your next provider. Signatures-by signing, you are acknowledging that this After Visit Summary has been reviewed with you and you have received a copy. Patient Signature:  ____________________________________________________________ Date:  ____________________________________________________________  
  
Aloma Snellen Provider Signature:  ____________________________________________________________ Date:  ____________________________________________________________

## 2018-08-30 NOTE — PERIOP NOTES
Kevin Watkins, daughter, and Shawn Mercado, friend, are designated points of contact for medical info.

## 2018-08-30 NOTE — ANESTHESIA PROCEDURE NOTES
Arterial Line Placement    Start time: 8/30/2018 8:45 AM  End time: 8/30/2018 9:00 AM  Performed by: Betsy Tapia  Authorized by: Betsy Tapia     Pre-Procedure  Preanesthetic Checklist: patient identified, risks and benefits discussed, anesthesia consent, site marked, patient being monitored, timeout performed and patient being monitored      Procedure:   Prep:  Chlorhexidine  Seldinger Technique?: Yes    Orientation:  Left  Location:  Radial artery  Catheter size:  20 G  Number of attempts:  2  Cont Cardiac Output Sensor: No      Assessment:   Post-procedure:  Line secured and sterile dressing applied  Patient Tolerance:  Patient tolerated the procedure well with no immediate complications

## 2018-08-30 NOTE — ANESTHESIA PREPROCEDURE EVALUATION
Anesthetic History     PONV          Review of Systems / Medical History  Patient summary reviewed and pertinent labs reviewed    Pulmonary  Within defined limits                 Neuro/Psych   Within defined limits           Cardiovascular    Hypertension: well controlled                   GI/Hepatic/Renal  Within defined limits              Endo/Other        Morbid obesity     Other Findings   Comments: Documentation of current medication  Current medications obtained, documented and obtained? YES      Risk Factors for Postoperative nausea/vomiting:       History of postoperative nausea/vomiting? NO       Female? YES       Motion sickness? NO       Intended opioid administration for postoperative analgesia? YES      Smoking Abstinence:  Current Smoker? NO  Elective Surgery? YES  Seen preoperatively by anesthesiologist or proxy prior to day of surgery? YES  Pt abstained from smoking 24 hours prior to anesthesia? N/A    Preventive care/screening for High Blood Pressure:  Aged 18 years and older: YES  Screened for high blood pressure: YES  Patients with high blood pressure referred to primary care provider   for BP management: YES                     Physical Exam    Airway  Mallampati: II  TM Distance: 4 - 6 cm  Neck ROM: normal range of motion   Mouth opening: Normal     Cardiovascular               Dental    Dentition: Caps/crowns  Comments: Temporary molar crown.      Pulmonary                 Abdominal  GI exam deferred       Other Findings            Anesthetic Plan    ASA: 3  Anesthesia type: general    Monitoring Plan: Arterial line      Induction: Intravenous  Anesthetic plan and risks discussed with: Patient

## 2018-08-30 NOTE — IP AVS SNAPSHOT
[address] Patient: Rockie Peabody MRN: [id number] DIM:9/8/7244 A check angelica indicates which time of day the medication should be taken. My Medications START taking these medications Instructions Each Dose to Equal  
 Morning Noon Evening Bedtime HYDROcodone-acetaminophen 5-325 mg per tablet Commonly known as:  Benesarahya Ashlyn Your last dose was: Your next dose is: Take 1-2 Tabs by mouth every six (6) hours as needed. Max Daily Amount: 8 Tabs. 1-2 Tab  
    
   
   
   
  
 methylPREDNISolone 4 mg tablet Commonly known as:  Ruel Pena Your last dose was: Your next dose is: As directed. CONTINUE taking these medications Instructions Each Dose to Equal  
 Morning Noon Evening Bedtime  
 amLODIPine 10 mg tablet Commonly known as:  Fawad Castanon Your last dose was: Your next dose is: Take  by mouth daily. HYZAAR 100-25 mg per tablet Generic drug:  losartan-hydroCHLOROthiazide Your last dose was: Your next dose is: Take 1 Tab by mouth daily. 1 Tab  
    
   
   
   
  
 metoprolol succinate 25 mg XL tablet Commonly known as:  TOPROL-XL Your last dose was: Your next dose is: Take 25 mg by mouth daily. 25 mg  
    
   
   
   
  
 topiramate 50 mg tablet Commonly known as:  TOPAMAX Your last dose was: Your next dose is:    
   
   
      
   
   
   
  
  
  
Where to Get Your Medications These medications were sent to [address] Phone:  [phone]  
  methylPREDNISolone 4 mg tablet Information on where to get these meds will be given to you by the nurse or doctor. ! Ask your nurse or doctor about these medications HYDROcodone-acetaminophen 5-325 mg per tablet

## 2018-08-30 NOTE — Clinical Note
Sheath #1: Closed using manual compression and ExoSeal. Site secured by transparent dressing. Pressure held for: 12 minutes.

## 2018-08-30 NOTE — CONSULTS
Consults       Internal Medicine Consult          Consult Requested By: Dr. Hua Mckenna, specialty    Subjective     HPI: Audrey Carmona is a 44 y.o. female with a PMHx of HTN who we were consulted for medical management while undergoing Image guided right frontal CRANIOTOMY FOR clipping of pericallosal artery  ANEURYSM. 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 Social History Main Topics    Smoking status: Never Smoker    Smokeless tobacco: Never Used    Alcohol use No    Drug use: No    Sexual activity: Not on file     Other Topics Concern    Not on file     Social History Narrative       FamilyHx:  Family History   Problem Relation Age of Onset    Diabetes Maternal Aunt     Hypertension Maternal Aunt     Diabetes Maternal Uncle     Hypertension Maternal Uncle        Prior to Admission Medications   Prescriptions Last Dose Informant Patient Reported? Taking? amLODIPine (NORVASC) 10 mg tablet 8/30/2018 at 0540  Yes Yes   Sig: Take  by mouth daily. losartan-hydroCHLOROthiazide (HYZAAR) 100-25 mg per tablet 8/30/2018 at 0540  Yes Yes   Sig: Take 1 Tab by mouth daily. metoprolol succinate (TOPROL-XL) 25 mg XL tablet 8/29/2018 at 2100 time  Yes Yes   Sig: Take 25 mg by mouth daily.    topiramate (TOPAMAX) 50 mg tablet 8/29/2018 at 2100  Yes Yes      Facility-Administered Medications: None       Review of Systems:  Unable to assess. S/p surgery      Objective      Visit Vitals    BP (!) 152/98 (BP 1 Location: Left arm, BP Patient Position: At rest)    Pulse 87    Temp 98.5 °F (36.9 °C)    Resp 16    Ht 5' 7.5\" (1.715 m)    Wt 116.6 kg (257 lb 2 oz)    SpO2 99%    BMI 39.68 kg/m2       Physical Exam:  General Appearance: Drowsy  HENT:Dressing noted  Lungs: CTA with normal respiratory effort  CV: RRR, no m/r/g  Abdomen: soft, non-tender, normal bowel sounds  Extremities: no cyanosis, no peripheral edema  Neuro: Drowsy  Psych: appropriate affect, alert and oriented to person, place and time      Imaging Reviewed:  No results found. Assessment/Plan     Active Hospital Problems    Diagnosis Date Noted    Brain aneurysm 08/30/2018    RA2 azygous cerebral aneurysm 12/05/2017    Obesity, morbid ([address] 12/05/2017    Essential hypertension 12/04/2017     Brain aneursym  -Image guided right frontal CRANIOTOMY FOR clipping of pericallosal artery  ANEURYSM  -defer to primary     Essential HTN  -defer parameters to Primary  -will help to reinforce. - Cont acceptable home medications for chronic conditions   - DVT protocol    I reviewed all available labs and imaging that were available prior to my encounter. We appreciate the consultation for medical management and appreciate being able to be involved with their care during hospitalization.       Masoud Banegas, ACNP-BC  [address] [place] [address] [facility]

## 2018-08-30 NOTE — Clinical Note
Right femoral artery. accessed successfully using fluoro and ultrasound guidance.  Number of attempts =  1.

## 2018-08-31 ENCOUNTER — APPOINTMENT (OUTPATIENT)
Dept: CT IMAGING | Age: 43
DRG: 027 | End: 2018-08-31
Attending: NEUROLOGICAL SURGERY
Payer: COMMERCIAL

## 2018-08-31 PROBLEM — D72.829 LEUKOCYTOSIS: Status: ACTIVE | Noted: 2018-08-31

## 2018-08-31 LAB
ABO + RH BLD: NORMAL
ANION GAP SERPL CALC-SCNC: 9 MMOL/L (ref 3–18)
BLD PROD TYP BPU: NORMAL
BLD PROD TYP BPU: NORMAL
BLOOD GROUP ANTIBODIES SERPL: NORMAL
BPU ID: NORMAL
BPU ID: NORMAL
BUN SERPL-MCNC: 13 MG/DL (ref 7–18)
BUN/CREAT SERPL: 15 (ref 12–20)
CALCIUM SERPL-MCNC: 8.7 MG/DL (ref 8.5–10.1)
CALLED TO:,BCALL1: NORMAL
CHLORIDE SERPL-SCNC: 109 MMOL/L (ref 100–108)
CO2 SERPL-SCNC: 26 MMOL/L (ref 21–32)
CREAT SERPL-MCNC: 0.89 MG/DL (ref 0.6–1.3)
CROSSMATCH RESULT,%XM: NORMAL
CROSSMATCH RESULT,%XM: NORMAL
ERYTHROCYTE [DISTWIDTH] IN BLOOD BY AUTOMATED COUNT: 14.9 % (ref 11.6–14.5)
GLUCOSE SERPL-MCNC: 137 MG/DL (ref 74–99)
HCT VFR BLD AUTO: 35.3 % (ref 35–45)
HGB BLD-MCNC: 11.9 G/DL (ref 12–16)
MCH RBC QN AUTO: 32.3 PG (ref 24–34)
MCHC RBC AUTO-ENTMCNC: 33.7 G/DL (ref 31–37)
MCV RBC AUTO: 95.9 FL (ref 74–97)
PLATELET # BLD AUTO: 357 K/UL (ref 135–420)
PMV BLD AUTO: 10.1 FL (ref 9.2–11.8)
POTASSIUM SERPL-SCNC: 3.7 MMOL/L (ref 3.5–5.5)
RBC # BLD AUTO: 3.68 M/UL (ref 4.2–5.3)
SODIUM SERPL-SCNC: 144 MMOL/L (ref 136–145)
SPECIMEN EXP DATE BLD: NORMAL
STATUS OF UNIT,%ST: NORMAL
STATUS OF UNIT,%ST: NORMAL
UNIT DIVISION, %UDIV: 0
UNIT DIVISION, %UDIV: 0
WBC # BLD AUTO: 16.6 K/UL (ref 4.6–13.2)

## 2018-08-31 PROCEDURE — 97162 PT EVAL MOD COMPLEX 30 MIN: CPT

## 2018-08-31 PROCEDURE — 65610000009 HC RM ICU NEURO

## 2018-08-31 PROCEDURE — 74011250637 HC RX REV CODE- 250/637: Performed by: FAMILY MEDICINE

## 2018-08-31 PROCEDURE — 74011250636 HC RX REV CODE- 250/636: Performed by: HOSPITALIST

## 2018-08-31 PROCEDURE — 77030011943

## 2018-08-31 PROCEDURE — 74011250637 HC RX REV CODE- 250/637: Performed by: NURSE PRACTITIONER

## 2018-08-31 PROCEDURE — 74011250637 HC RX REV CODE- 250/637: Performed by: NEUROLOGICAL SURGERY

## 2018-08-31 PROCEDURE — 74011250636 HC RX REV CODE- 250/636: Performed by: NEUROLOGICAL SURGERY

## 2018-08-31 PROCEDURE — 97530 THERAPEUTIC ACTIVITIES: CPT

## 2018-08-31 PROCEDURE — 85027 COMPLETE CBC AUTOMATED: CPT | Performed by: NEUROLOGICAL SURGERY

## 2018-08-31 PROCEDURE — 80048 BASIC METABOLIC PNL TOTAL CA: CPT | Performed by: NEUROLOGICAL SURGERY

## 2018-08-31 PROCEDURE — 36600 WITHDRAWAL OF ARTERIAL BLOOD: CPT | Performed by: NURSE PRACTITIONER

## 2018-08-31 PROCEDURE — 77010033678 HC OXYGEN DAILY

## 2018-08-31 PROCEDURE — 51798 US URINE CAPACITY MEASURE: CPT | Performed by: NURSE PRACTITIONER

## 2018-08-31 PROCEDURE — 74011000250 HC RX REV CODE- 250: Performed by: NEUROLOGICAL SURGERY

## 2018-08-31 PROCEDURE — 70450 CT HEAD/BRAIN W/O DYE: CPT

## 2018-08-31 RX ORDER — METOPROLOL SUCCINATE 25 MG/1
25 TABLET, EXTENDED RELEASE ORAL DAILY
Status: DISCONTINUED | OUTPATIENT
Start: 2018-08-31 | End: 2018-09-02 | Stop reason: HOSPADM

## 2018-08-31 RX ORDER — TOPIRAMATE 25 MG/1
50 TABLET ORAL DAILY
Status: DISCONTINUED | OUTPATIENT
Start: 2018-08-31 | End: 2018-09-02 | Stop reason: HOSPADM

## 2018-08-31 RX ADMIN — HYDROCODONE BITARTRATE AND ACETAMINOPHEN 1 TABLET: 5; 325 TABLET ORAL at 17:32

## 2018-08-31 RX ADMIN — LOSARTAN POTASSIUM 100 MG: 50 TABLET ORAL at 08:41

## 2018-08-31 RX ADMIN — SODIUM CHLORIDE AND POTASSIUM CHLORIDE: 9; 1.49 INJECTION, SOLUTION INTRAVENOUS at 08:02

## 2018-08-31 RX ADMIN — DEXAMETHASONE SODIUM PHOSPHATE 4 MG: 4 INJECTION, SOLUTION INTRAMUSCULAR; INTRAVENOUS at 05:20

## 2018-08-31 RX ADMIN — DEXAMETHASONE SODIUM PHOSPHATE 4 MG: 4 INJECTION, SOLUTION INTRAMUSCULAR; INTRAVENOUS at 23:01

## 2018-08-31 RX ADMIN — AMLODIPINE BESYLATE 10 MG: 10 TABLET ORAL at 08:41

## 2018-08-31 RX ADMIN — MORPHINE SULFATE 2 MG: 2 INJECTION, SOLUTION INTRAMUSCULAR; INTRAVENOUS at 03:07

## 2018-08-31 RX ADMIN — DEXAMETHASONE SODIUM PHOSPHATE 4 MG: 4 INJECTION, SOLUTION INTRAMUSCULAR; INTRAVENOUS at 17:32

## 2018-08-31 RX ADMIN — HYDROCHLOROTHIAZIDE 25 MG: 25 TABLET ORAL at 08:41

## 2018-08-31 RX ADMIN — TOPIRAMATE 50 MG: 25 TABLET, FILM COATED ORAL at 20:55

## 2018-08-31 RX ADMIN — FAMOTIDINE 20 MG: 10 INJECTION, SOLUTION INTRAVENOUS at 08:40

## 2018-08-31 RX ADMIN — DOCUSATE SODIUM 100 MG: 100 CAPSULE, LIQUID FILLED ORAL at 08:40

## 2018-08-31 RX ADMIN — CEFAZOLIN SODIUM 2 G: 2 SOLUTION INTRAVENOUS at 08:40

## 2018-08-31 RX ADMIN — METOPROLOL SUCCINATE 25 MG: 25 TABLET, EXTENDED RELEASE ORAL at 20:55

## 2018-08-31 RX ADMIN — MORPHINE SULFATE 2 MG: 2 INJECTION, SOLUTION INTRAMUSCULAR; INTRAVENOUS at 01:14

## 2018-08-31 RX ADMIN — CEFAZOLIN SODIUM 2 G: 2 SOLUTION INTRAVENOUS at 01:15

## 2018-08-31 RX ADMIN — DOCUSATE SODIUM 100 MG: 100 CAPSULE, LIQUID FILLED ORAL at 17:32

## 2018-08-31 RX ADMIN — MORPHINE SULFATE 2 MG: 2 INJECTION, SOLUTION INTRAMUSCULAR; INTRAVENOUS at 20:53

## 2018-08-31 RX ADMIN — DEXAMETHASONE SODIUM PHOSPHATE 4 MG: 4 INJECTION, SOLUTION INTRAMUSCULAR; INTRAVENOUS at 11:39

## 2018-08-31 RX ADMIN — FAMOTIDINE 20 MG: 10 INJECTION, SOLUTION INTRAVENOUS at 20:55

## 2018-08-31 RX ADMIN — MORPHINE SULFATE 2 MG: 2 INJECTION, SOLUTION INTRAMUSCULAR; INTRAVENOUS at 08:39

## 2018-08-31 RX ADMIN — MORPHINE SULFATE 2 MG: 2 INJECTION, SOLUTION INTRAMUSCULAR; INTRAVENOUS at 05:20

## 2018-08-31 RX ADMIN — HYDROCODONE BITARTRATE AND ACETAMINOPHEN 1 TABLET: 5; 325 TABLET ORAL at 12:33

## 2018-08-31 NOTE — PROGRESS NOTES
Problem: Falls - Risk of  Goal: *Absence of Falls  Document Jean Paul Fall Risk and appropriate interventions in the flowsheet. Outcome: Progressing Towards Goal  Fall Risk Interventions:  Mobility Interventions: Bed/chair exit alarm    Mentation Interventions: Bed/chair exit alarm, Room close to nurse's station    Medication Interventions: Bed/chair exit alarm    Elimination Interventions: Bed/chair exit alarm, Call light in reach             Problem: Pressure Injury - Risk of  Goal: *Prevention of pressure injury  Document Singh Scale and appropriate interventions in the flowsheet. Outcome: Progressing Towards Goal  Pressure Injury Interventions:  Sensory Interventions: Assess changes in LOC, Keep linens dry and wrinkle-free, Turn and reposition approx. every two hours (pillows and wedges if needed)         Activity Interventions: Pressure redistribution bed/mattress(bed type)    Mobility Interventions: HOB 30 degrees or less, Pressure redistribution bed/mattress (bed type), Turn and reposition approx.  every two hours(pillow and wedges)    Nutrition Interventions: Document food/fluid/supplement intake

## 2018-08-31 NOTE — PROGRESS NOTES
conducted an initial consultation and Spiritual Assessment for Merlin Lab, who is a 44 y.o.,female. Patients Primary Language is: [place]. According to the patients EMR Sabianism Affiliation is: No Taoist. The reason the Patient came to the hospital is:   Patient Active Problem List    Diagnosis Date Noted    Leukocytosis 08/31/2018    Brain aneurysm 08/30/2018    RA2 azygous cerebral aneurysm 12/05/2017    Family history of cerebral aneurysm 12/05/2017    Obesity, morbid ([address] 12/05/2017    Essential hypertension 12/04/2017    LAChA infundibulum vs small aneurusm 12/04/2017        The  provided the following Interventions:  Initiated a relationship of care and support with patient in room 2604 this morning. Listened empathically as patient shared briefly her story of being here and how things have been since her surgery. Provided information about Spiritual Care Services. Offered prayer and assurance of continued prayers on patients behalf. The following outcomes were achieved:  Patient shared limited information about her medical narrative and spiritual journey. Patient processed feeling about current hospitalization. Patient expressed gratitude for pastoral care visit. Assessment:  Patient does not have any Sikh/cultural needs that will affect patients preferences in health care. There are no further spiritual or Sikh issues which require Spiritual Care Services interventions at this time. Plan:  Chaplains will continue to follow and will provide pastoral care on an as needed/requested basis    . Lynda Munoz   Spiritual Care   [phone]

## 2018-08-31 NOTE — PROGRESS NOTES
Progress Note      Patient: Lupis Prather               Sex: female          DOA: 8/30/2018       YOB: 1975      Age:  44 y.o.        LOS:  LOS: 1 day               Subjective / Interval Hx  I    Lupis Prather is a 44 y.o. female  who presents with Complex pericallosal artery aneurysm. She is s/p right frontal craniotomy for complete clipping of pericallosal artery aneurysm. She also has hx HTN and migraine headaches. She is afebrile , denies sob, chest pain. 8/31: Complains of headache and 1 episode of emesis. Now denies nausea. Objective:      Visit Vitals    /71    Pulse 77    Temp 98.4 °F (36.9 °C)    Resp 13    Ht 5' 7.5\" (1.715 m)    Wt 116.6 kg (257 lb 2 oz)    SpO2 94%    BMI 39.68 kg/m2             Physical Exam   Constitutional: She is oriented to person, place, and time. She appears well-developed and well-nourished. No distress. HENT:   Head: Normocephalic. Mouth/Throat: No oropharyngeal exudate. Bandage around surgical area    Eyes: Conjunctivae are normal.   Neck: Neck supple. Cardiovascular: Normal rate, regular rhythm and normal heart sounds. No murmur heard. Pulmonary/Chest: Effort normal and breath sounds normal. No respiratory distress. She has no wheezes. She has no rales. Abdominal: Soft. Bowel sounds are normal.   Musculoskeletal: She exhibits no edema. Neurological: She is alert and oriented to person, place, and time. Skin: Skin is warm. No rash noted. She is not diaphoretic. No erythema. No pallor. Psychiatric: She has a normal mood and affect.          Intake and Output:  Current Shift:     Last three shifts:  08/29 1901 - 08/31 0700  In: 4370.8 [P.O.:90; I.V.:3880.8]  Out: 2863 [Urine:3415]    Recent Results (from the past 48 hour(s))   HCG URINE, QL. - POC    Collection Time: 08/30/18  7:26 AM   Result Value Ref Range    Pregnancy test,urine (POC) NEGATIVE  NEG     METABOLIC PANEL, COMPREHENSIVE    Collection Time: 08/30/18  6:18 PM   Result Value Ref Range    Sodium 143 136 - 145 mmol/L    Potassium 3.6 3.5 - 5.5 mmol/L    Chloride 109 (H) 100 - 108 mmol/L    CO2 23 21 - 32 mmol/L    Anion gap 11 3.0 - 18 mmol/L    Glucose 182 (H) 74 - 99 mg/dL    BUN 11 7.0 - 18 MG/DL    Creatinine 1.06 0.6 - 1.3 MG/DL    BUN/Creatinine ratio 10 (L) 12 - 20      GFR est AA >60 >60 ml/min/1.73m2    GFR est non-AA 57 (L) >60 ml/min/1.73m2    Calcium 8.5 8.5 - 10.1 MG/DL    Bilirubin, total 0.3 0.2 - 1.0 MG/DL    ALT (SGPT) 15 13 - 56 U/L    AST (SGOT) 13 (L) 15 - 37 U/L    Alk.  phosphatase 96 45 - 117 U/L    Protein, total 7.6 6.4 - 8.2 g/dL    Albumin 3.3 (L) 3.4 - 5.0 g/dL    Globulin 4.3 (H) 2.0 - 4.0 g/dL    A-G Ratio 0.8 0.8 - 1.7     URINALYSIS W/MICROSCOPIC    Collection Time: 08/30/18  8:30 PM   Result Value Ref Range    Color YELLOW      Appearance CLEAR      Specific gravity >1.030 (H) 1.005 - 1.030    pH (UA) 5.5 5.0 - 8.0      Protein NEGATIVE  NEG mg/dL    Glucose NEGATIVE  NEG mg/dL    Ketone NEGATIVE  NEG mg/dL    Bilirubin NEGATIVE  NEG      Blood NEGATIVE  NEG      Urobilinogen 0.2 0.2 - 1.0 EU/dL    Nitrites NEGATIVE  NEG      Leukocyte Esterase NEGATIVE  NEG      WBC 0 to 3 0 - 4 /hpf    RBC 0 to 3 0 - 5 /hpf    Epithelial cells FEW 0 - 5 /lpf    Bacteria NEGATIVE  NEG /hpf   METABOLIC PANEL, BASIC    Collection Time: 08/31/18  3:00 AM   Result Value Ref Range    Sodium 144 136 - 145 mmol/L    Potassium 3.7 3.5 - 5.5 mmol/L    Chloride 109 (H) 100 - 108 mmol/L    CO2 26 21 - 32 mmol/L    Anion gap 9 3.0 - 18 mmol/L    Glucose 137 (H) 74 - 99 mg/dL    BUN 13 7.0 - 18 MG/DL    Creatinine 0.89 0.6 - 1.3 MG/DL    BUN/Creatinine ratio 15 12 - 20      GFR est AA >60 >60 ml/min/1.73m2    GFR est non-AA >60 >60 ml/min/1.73m2    Calcium 8.7 8.5 - 10.1 MG/DL   CBC W/O DIFF    Collection Time: 08/31/18  3:00 AM   Result Value Ref Range    WBC 16.6 (H) 4.6 - 13.2 K/uL    RBC 3.68 (L) 4.20 - 5.30 M/uL    HGB 11.9 (L) 12.0 - 16.0 g/dL    HCT 35.3 35.0 - 45.0 %    MCV 95.9 74.0 - 97.0 FL    MCH 32.3 24.0 - 34.0 PG    MCHC 33.7 31.0 - 37.0 g/dL    RDW 14.9 (H) 11.6 - 14.5 %    PLATELET 614 805 - 472 K/uL    MPV 10.1 9.2 - 11.8 FL       Lab/Data Reviewed: All lab results for the last 24 hours reviewed. 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)    Active Problems:    Essential hypertension (12/4/2017)      Obesity, morbid ([address] (12/5/2017)      Brain aneurysm (8/30/2018)      Leukocytosis (8/31/2018)        PLAN    Pericallosal artery aneurysm  -  s/p right frontal craniotomy for complete clipping of pericallosal artery aneurysm  - Dexamethasone  - BP < 140 per neurosurgery   - pain control as needed for headache   - Neurosurgery following     HTN   - Cardene prn   - Norvasc , cozaar, HCTZ, Toprol   - goal sbp < 140 per neurosurgery    Leukocytosis  - Likely due to steroids do not think its due to an infective process   - monitor CBC      ?  Migraine HA   - Topamax     Obesity   - advise weight loss and life style changes    DVT prophylaxis - SCD's    GI prophylaxis - Pepcid     Full code     Francis Ram MD  August 31, 2018

## 2018-08-31 NOTE — PROGRESS NOTES
1550 Pt arrived from OR, pt extremely lethargic, slow to cooperate with neuroMonroe at bedside. Pt does follow commands but with repeated stimulation and minimal effort to mendoza. Pt not answering orientation questions at this time, will continue to monitor. Pt placed in RT 2/2 femoral access in angio. Angio c/d/i no signs of hematoma. Bed locked and lowered, siderails up x3, pt placed on monitor, alarm parameters set. 1700 Pt more alert able to answer orientation questions, still weak in all extremities. 1800 Dr Kitty Awad aware of pt lethargic and minimal ability to  extremities off the bed. Verbal order for chemistry provided. 1920 Bedside and Verbal shift change report given to SARAH FRANCOIS RN (oncoming nurse) by Yanci Cartwright RN (offgoing nurse). Report included the following information SBAR, Kardex, Procedure Summary, Intake/Output, MAR, Recent Results, Cardiac Rhythm NSR, Alarm Parameters  and Quality Measures. Dual Neuro completed at bedside.

## 2018-08-31 NOTE — PROGRESS NOTES
Problem: Falls - Risk of  Goal: *Absence of Falls  Document Jean Paul Fall Risk and appropriate interventions in the flowsheet. Outcome: Progressing Towards Goal  Fall Risk Interventions:  Mobility Interventions: Patient to call before getting OOB, Strengthening exercises (ROM-active/passive), Bed/chair exit alarm    Mentation Interventions: Bed/chair exit alarm, Room close to nurse's station    Medication Interventions: Bed/chair exit alarm    Elimination Interventions: Bed/chair exit alarm, Call light in reach, Patient to call for help with toileting needs             Problem: Pressure Injury - Risk of  Goal: *Prevention of pressure injury  Document Singh Scale and appropriate interventions in the flowsheet.    Outcome: Progressing Towards Goal  Pressure Injury Interventions:  Sensory Interventions: Use 30-degree side-lying position, Pad between skin to skin, Keep linens dry and wrinkle-free         Activity Interventions: Pressure redistribution bed/mattress(bed type)    Mobility Interventions: HOB 30 degrees or less, Pressure redistribution bed/mattress (bed type)    Nutrition Interventions: Document food/fluid/supplement intake    Friction and Shear Interventions: HOB 30 degrees or less, Lift sheet

## 2018-08-31 NOTE — PROGRESS NOTES
Assumed care of pt. Bedside shift report received from [facility]. Dual neuro completed. Pt opens eyes to voice, a/ox4, CONNELLY, FC, + sensation, +3 PERRL  Lungs diminished, on 2L NC  SR on the monitor, pulses palpable, SCDs to BLE  Ab soft nontender, munguia draining clear yellow urine    [id number] Pt transported to CT on the portable monitor with RN returned to room in stable condition    0839- Pt complaining of 8/10 HA, PRN morphine given     0948- Munguia cath removed, pt tolerated well    1030- Pt transferred with x1 assistance to recliner and vomited undigested food. 1233- Pt complaining of 6/10 HA, PRN norco given     1732- Pt complaining of 6/10 HA, PRN norco given     1840- Pt transferred with x1 assistance to the [facility], pt unable to void at this time after attempting for 25 min    1900- Bedside and Verbal shift change report given to [facility] (oncoming nurse) by Jaswinder Jessica. Amara Zhu, EUGENIA   (offgoing nurse). Report included the following information SBAR, Kardex, Intake/Output, MAR, Recent Results, Cardiac Rhythm SR and Alarm Parameters . Dual neuro completed. Pt assisted back to bed.

## 2018-08-31 NOTE — OP NOTES
[facility]  OPERATIVE REPORT    Dimple Nielson  MR#: [id number]  : 1975  ACCOUNT #: [de-identified]   DATE OF SERVICE: 2018    PREOPERATIVE DIAGNOSIS:  Complex pericallosal artery aneurysm. POSTOPERATIVE DIAGNOSIS:  Complex pericallosal artery aneurysm. PROCEDURES PERFORMED:  1. Right frontal craniotomy for complete clipping of pericallosal artery aneurysm. 2.  Secondary dural repair. 3.  Cranioplasty skull reconstruction. 4.  Intraoperative image guidance. 5.  Intraoperative microdissection. SURGEON:  Hao Darden MD    ANESTHESIA:  General orotracheal.    ESTIMATED BLOOD LOSS:  Minimal.    COMPLICATIONS:  None. SPECIMENS REMOVED:  None. ASSISTANT:  SA.     IMPLANTS:  See note. BRIEF CLINICAL COURSE:  A 41-year-old lady with family history of aneurysm, found to have a proximal right pericallosal artery aneurysm or internal frontal artery aneurysm. She also has an ICA terminus aneurysm. She was discussed at neurovascular conference and she requested to undergo the above procedure after considering all options. PROCEDURE IN DETAIL:  After informed consent was given, the patient was brought to the operating room and then underwent induction of general orotracheal anesthesia without difficulty. Following this, she was carefully positioned supine upon the operating table, all pressure points carefully padded. Neuro monitoring electrodes were placed. The OptuLink image guidance system was used and the facial and scalp anatomy was registered with the workstation to create an operative plan using the MRA and CTA. A coronal incision eccentric to the right of midline about at the coronal suture was outlined. Linear hair shave technique was carried out and prepped and draped in usual sterile fashion. Local anesthetic was infiltrated. The incision was opened.   Craniotomy was made with Midas Joe drill up to the midline, edges were waxed with Hemasorb, the dura was opened based medially. Microscope was brought into the case. Navigation was used to guide us to the aneurysm. The interhemispheric dissection was done. Corpus callosum was identified. I identified the genu of the corpus callosum, found the pericallosal artery and then deep to this about 11 mm was the aneurysm. Using microdissection, the aneurysm was progressively isolated. This was complex in that it encompassed more than 180-degree circumference of the vessel and the branching vessel was incorporated in the origin as well. I positioned and trialled a few clips. Ultimately, I chose a forward curving mini Yasargil titanium aneurysm clip. This did not quite get all the way around and then I supplemented this with a straight mini clip. A complete clipping appeared to be done, all the arteries insonated with intraoperative ultrasonography well. Dr. Alpesh Calderon then entered the case, did intraoperative angiography showing complete clipping of the aneurysm and good filling of all the vessels, a little bit slow in the branch vessel, but filling out nicely. The wound was then irrigated. Surgicel was placed on the exposed jhonathan. The dura was closed primarily on the posterior limb just a small bit and then completely secondarily with a sutured [employer] dura repair graft with circumferential 4-0 Nurolon suture in a watertight fashion. The bone flap was then secured with the Deborah mini plates and cranioplasty skull reconstruction was carried out with Deborah HydroSet bone cement. The wound was irrigated. We closed with vancomycin powder, 2-0 Vicryl, staples in the skin, a sterile dressing and standard head wrap were applied. All sponge and needle counts correct at the end of the case and the patient was extubated in the operating room, taken to the neurosurgical intensive care unit in stable condition.       MD JAGRUTI James / JOSÉ  D: 08/30/2018 15:32     T: 08/30/2018 23:50  JOB #: [id number]

## 2018-08-31 NOTE — PROGRESS NOTES
Reason for Admission:   Aneurysm, cerebral                  RRAT Score:   3              Do you (patient/family) have any concerns for transition/discharge? none                  Plan for utilizing home health:    As indicated     Likelihood of readmission?   mod            Transition of Care Plan:  Home health vs SNF  Interviewed pateint, she agrees to share her discharge information with her daughter, Ebenezer Yip . Demographic information verified. She was independent prior to admission and sees Dr Loli Bailey for her primary care needs. Her discharge plan is to return home with home care as indicated. She stated , \" I believe I will be more comfortable home, my family will be there. \"  PatIEnt will need home care orders and a walker at discharge. Care Management Interventions  PCP Verified by CM: Yes  Palliative Care Criteria Met (RRAT>21 & CHF Dx)?: No  Mode of Transport at Discharge: Other (see comment)  Transition of Care Consult (CM Consult): Discharge Planning (home with home care)  MyChart Signup: No  Discharge Durable Medical Equipment: No  Health Maintenance Reviewed: Yes  Physical Therapy Consult: Yes  Occupational Therapy Consult: Yes  Speech Therapy Consult: No  Current Support Network:  Other (daughter and roomate)  Confirm Follow Up Transport: Family  Plan discussed with Pt/Family/Caregiver: Yes  [place] Resource Information Provided?: No  Discharge Location  Discharge Placement: Home with home health

## 2018-08-31 NOTE — PROGRESS NOTES
1900 - Report received from [facility]. Orders, medications, labs, skin, and neuro exam reviewed. 1945 - Pt munguia removed at 0945. Pt has not voided all day. Pt bladder scanned resulting in 475 mL. Hospitalist paged. Order received from Dr. Phyllis Briseno to straight cath pt every 8 hours for bladder scan greater than 250 mL.  0710 - Report given to Erik Sifuentes RN. Orders, medications, labs, skin, and neuro exam reviewed.

## 2018-08-31 NOTE — PROGRESS NOTES
Progress Note      Patient: Debora Angelucci               Sex: female          DOA: 8/30/2018         YOB: 1975      Age:  44 y.o.        LOS:  LOS: 1 day                  Procedure(s):  Image guided right frontal CRANIOTOMY FOR clipping of pericallosal artery  ANEURYSM  1. Right frontal craniotomy for complete clipping of pericallosal artery aneurysm. 2.  Secondary dural repair. 3.  Cranioplasty skull reconstruction. 4.  Intraoperative image guidance. 5.  Intraoperative microdissection. SURGERY DATE: 8/30/2018               Dx:  Aneurysm, cerebral [I67.1]        Past Medical History:   Diagnosis Date    Dizziness     Essential hypertension     Family history of cerebral aneurysm 12/5/2017    Nausea & vomiting     Obesity, morbid ([address] 12/5/2017    RA2 azygous cerebral aneurysm 12/5/2017       Past Surgical History:   Procedure Laterality Date    HX DILATION AND CURETTAGE      HX GI      partial pancreas, stomach removed    HX HYSTEROSCOPY WITH ENDOMETRIAL ABLATION      HX SPLENECTOMY         POD# 1  SUBJECTIVE:  Doing well. Some frontal headache as expected. Denies nausea and vomiting.         OBJECTIVE:  Patient Vitals for the past 24 hrs:   Temp Pulse Resp BP SpO2   08/31/18 1000 - 97 21 132/82 92 %   08/31/18 0900 - 90 18 137/73 94 %   08/31/18 0831 - - - 123/73 -   08/31/18 0800 98.4 °F (36.9 °C) 77 13 129/71 94 %   08/31/18 0700 - 86 15 132/82 95 %   08/31/18 0600 - 81 14 122/73 95 %   08/31/18 0500 - 85 14 126/74 95 %   08/31/18 0400 98.7 °F (37.1 °C) 85 15 124/73 95 %   08/31/18 0300 - 87 15 124/77 95 %   08/31/18 0200 - 87 16 126/71 95 %   08/31/18 0100 - 88 17 117/72 96 %   08/31/18 0001 99.5 °F (37.5 °C) 92 16 111/70 95 %   08/30/18 2300 - 92 18 115/70 95 %   08/30/18 2200 - 92 18 113/69 95 %   08/30/18 2100 - (!) 101 20 122/78 97 %   08/30/18 2000 98.9 °F (37.2 °C) 94 18 111/63 96 %   08/30/18 1930 - 95 17 119/72 94 %   08/30/18 1900 - 91 20 106/68 95 %   08/30/18 1830 - 89 19 101/63 96 %   08/30/18 1800 - 88 20 103/65 99 %   08/30/18 1730 - 91 20 108/70 97 %   08/30/18 1700 - 92 20 103/66 96 %   08/30/18 1630 - 96 16 - 96 %   08/30/18 1600 98.6 °F (37 °C) 96 18 115/72 97 %   08/30/18 1545 - - - - 95 %   08/30/18 1544 98.7 °F (37.1 °C) 96 25 116/63 96 %           Vent         Intake and Output    Intake/Output Summary (Last 24 hours) at 08/31/18 1049  Last data filed at 08/31/18 1000   Gross per 24 hour   Intake          4345.75 ml   Output             3165 ml   Net          1180.75 ml         Data    Recent Results (from the past 24 hour(s))   METABOLIC PANEL, COMPREHENSIVE    Collection Time: 08/30/18  6:18 PM   Result Value Ref Range    Sodium 143 136 - 145 mmol/L    Potassium 3.6 3.5 - 5.5 mmol/L    Chloride 109 (H) 100 - 108 mmol/L    CO2 23 21 - 32 mmol/L    Anion gap 11 3.0 - 18 mmol/L    Glucose 182 (H) 74 - 99 mg/dL    BUN 11 7.0 - 18 MG/DL    Creatinine 1.06 0.6 - 1.3 MG/DL    BUN/Creatinine ratio 10 (L) 12 - 20      GFR est AA >60 >60 ml/min/1.73m2    GFR est non-AA 57 (L) >60 ml/min/1.73m2    Calcium 8.5 8.5 - 10.1 MG/DL    Bilirubin, total 0.3 0.2 - 1.0 MG/DL    ALT (SGPT) 15 13 - 56 U/L    AST (SGOT) 13 (L) 15 - 37 U/L    Alk.  phosphatase 96 45 - 117 U/L    Protein, total 7.6 6.4 - 8.2 g/dL    Albumin 3.3 (L) 3.4 - 5.0 g/dL    Globulin 4.3 (H) 2.0 - 4.0 g/dL    A-G Ratio 0.8 0.8 - 1.7     URINALYSIS W/MICROSCOPIC    Collection Time: 08/30/18  8:30 PM   Result Value Ref Range    Color YELLOW      Appearance CLEAR      Specific gravity >1.030 (H) 1.005 - 1.030    pH (UA) 5.5 5.0 - 8.0      Protein NEGATIVE  NEG mg/dL    Glucose NEGATIVE  NEG mg/dL    Ketone NEGATIVE  NEG mg/dL    Bilirubin NEGATIVE  NEG      Blood NEGATIVE  NEG      Urobilinogen 0.2 0.2 - 1.0 EU/dL    Nitrites NEGATIVE  NEG      Leukocyte Esterase NEGATIVE  NEG      WBC 0 to 3 0 - 4 /hpf    RBC 0 to 3 0 - 5 /hpf    Epithelial cells FEW 0 - 5 /lpf    Bacteria NEGATIVE  NEG /hpf   METABOLIC PANEL, BASIC    Collection Time: 08/31/18  3:00 AM   Result Value Ref Range    Sodium 144 136 - 145 mmol/L    Potassium 3.7 3.5 - 5.5 mmol/L    Chloride 109 (H) 100 - 108 mmol/L    CO2 26 21 - 32 mmol/L    Anion gap 9 3.0 - 18 mmol/L    Glucose 137 (H) 74 - 99 mg/dL    BUN 13 7.0 - 18 MG/DL    Creatinine 0.89 0.6 - 1.3 MG/DL    BUN/Creatinine ratio 15 12 - 20      GFR est AA >60 >60 ml/min/1.73m2    GFR est non-AA >60 >60 ml/min/1.73m2    Calcium 8.7 8.5 - 10.1 MG/DL   CBC W/O DIFF    Collection Time: 08/31/18  3:00 AM   Result Value Ref Range    WBC 16.6 (H) 4.6 - 13.2 K/uL    RBC 3.68 (L) 4.20 - 5.30 M/uL    HGB 11.9 (L) 12.0 - 16.0 g/dL    HCT 35.3 35.0 - 45.0 %    MCV 95.9 74.0 - 97.0 FL    MCH 32.3 24.0 - 34.0 PG    MCHC 33.7 31.0 - 37.0 g/dL    RDW 14.9 (H) 11.6 - 14.5 %    PLATELET 450 938 - 828 K/uL    MPV 10.1 9.2 - 11.8 FL        Current Facility-Administered Medications   Medication Dose Route Frequency    amLODIPine (NORVASC) tablet 10 mg  10 mg Oral DAILY    metoprolol succinate (TOPROL-XL) XL tablet 25 mg  25 mg Oral DAILY    topiramate (TOPAMAX) tablet 50 mg  50 mg Oral DAILY    0.9% sodium chloride with KCl 20 mEq/L infusion   IntraVENous CONTINUOUS    ondansetron (ZOFRAN) injection 4 mg  4 mg IntraVENous Q4H PRN    dexamethasone (DECADRON) 4 mg/mL injection 4 mg  4 mg IntraVENous Q6H    [START ON 9/2/2018] dexamethasone (DECADRON) 4 mg/mL injection 4 mg  4 mg IntraVENous Q8H    [START ON 9/3/2018] dexamethasone (DECADRON) 4 mg/mL injection 2 mg  2 mg IntraVENous Q6H    [START ON 9/4/2018] dexamethasone (DECADRON) 4 mg/mL injection 2 mg  2 mg IntraVENous Q8H    [START ON 9/5/2018] dexamethasone (DECADRON) 4 mg/mL injection 2 mg  2 mg IntraVENous Q12H    bisacodyl (DULCOLAX) tablet 10 mg  10 mg Oral DAILY PRN    famotidine (PF) (PEPCID) 20 mg in sodium chloride 0.9% 10 mL injection  20 mg IntraVENous Q12H    labetalol (NORMODYNE;TRANDATE) injection 10-20 mg  10-20 mg IntraVENous Q30MIN PRN    metoprolol (LOPRESSOR) injection 2.5 mg  2.5 mg IntraVENous Q6H PRN    acetaminophen (TYLENOL) tablet 650 mg  650 mg Oral Q6H PRN    HYDROcodone-acetaminophen (NORCO) 5-325 mg per tablet 1 Tab  1 Tab Oral Q4H PRN    labetalol (NORMODYNE;TRANDATE) 200 mg in 0.9% sodium chloride 200 mL infusion  0.5-2 mg/min IntraVENous TITRATE    niCARdipine (CARDENE) 25 mg in 0.9% sodium chloride 250 mL infusion  0-15 mg/hr IntraVENous TITRATE    losartan (COZAAR) tablet 100 mg  100 mg Oral DAILY    hydroCHLOROthiazide (HYDRODIURIL) tablet 25 mg  25 mg Oral DAILY    morphine injection 2-4 mg  2-4 mg IntraVENous Q1H PRN    docusate sodium (COLACE) capsule 100 mg  100 mg Oral BID       Physical Exam  General:  Neurologic: Alert and oriented X 3  Eyes: conjunctivae/corneas clear. PERRL, EOM's intact. Fundi benign  Visual pepper were intact to confrontation  Eye movements were full and conjugate, saccades were accurate, pursuit movements were smooth, and there was no nystagmus. The palate and tongue moved in the midline. Motor: 5/5 throughout  Skin: Dressing to crani CDI  Lungs: clear to auscultation bilaterally  Heart: regular rate and rhythm, S1, S2 normal, no murmur, click, rub or gallop  Abdomen: soft, non-tender. Bowel sounds normal. No masses,  no organomegaly  Extremities: extremities normal, atraumatic, no cyanosis or edema  Pulses: 2+ and symmetric          Assessment/Plan    44y.o. year old female who is hospital day 1 for Anterior cerebral artery aneurysm. 1.)  Neurologic:  Doing well. Expected headache. Mobilize. Analgesics as needed. Steroid taper. CT this AM shows clips in good position with expected surgical changes. 2.)  Cardiovascular:  Keep SBP < 140.      3.)  Pulmonary:  Pulmonary toilet. Keep sats > 94%. Titrate O2 as needed. 4.)  Hematology:  H&H stable with adequate platelets.         Assessment and plan made with Dr. Sukumar Guidry, NP  8/31/2018  10:49 AM  Patient seen and examined. Agree with above.

## 2018-08-31 NOTE — PROGRESS NOTES
Problem: Falls - Risk of  Goal: *Absence of Falls  Document Jean Paul Fall Risk and appropriate interventions in the flowsheet. Outcome: Progressing Towards Goal  Fall Risk Interventions:  Mobility Interventions: Bed/chair exit alarm    Mentation Interventions: Adequate sleep, hydration, pain control, Bed/chair exit alarm, More frequent rounding    Medication Interventions: Bed/chair exit alarm    Elimination Interventions: Call light in reach, Bed/chair exit alarm, Toileting schedule/hourly rounds             Problem: Pressure Injury - Risk of  Goal: *Prevention of pressure injury  Document Singh Scale and appropriate interventions in the flowsheet. Outcome: Progressing Towards Goal  Pressure Injury Interventions:  Sensory Interventions: Assess changes in LOC, Assess need for specialty bed, Check visual cues for pain, Keep linens dry and wrinkle-free, Minimize linen layers, Pressure redistribution bed/mattress (bed type), Turn and reposition approx. every two hours (pillows and wedges if needed)         Activity Interventions: Assess need for specialty bed, Increase time out of bed, Pressure redistribution bed/mattress(bed type)    Mobility Interventions: Assess need for specialty bed, HOB 30 degrees or less, Pressure redistribution bed/mattress (bed type), PT/OT evaluation, Turn and reposition approx.  every two hours(pillow and wedges)    Nutrition Interventions: Document food/fluid/supplement intake, Discuss nutritional consult with provider, Offer support with meals,snacks and hydration

## 2018-08-31 NOTE — PROGRESS NOTES
1900 - Report received from Lilliana Segura [place]. Orders, medications, labs, skin, and neuro exam reviewed. 2100 - Pt more alert and complaining of pain 10/10 in her head. Pt given 2 mg IV morphine per PRN order. Will continue to monitor. 2130 - Pt sleeping with no visible signs of pain. Vitals stable. 2230 - Pt given 1 tab 5-325 mg Norco per PRN order. Will continue to monitor. 2300 - Pt given 2 mg IV morphine per PRN order. Will continue to monitor. 0115 - Pt given 2 mg IV morphine per PRN order. Will continue to monitor. 1585 - Pt given 2 mg IV morphine per PRN order. Will continue to monitor. 5040 - Pt given 2 mg IV morphine per PRN order. Will continue to monitor.

## 2018-09-01 PROCEDURE — 74011250636 HC RX REV CODE- 250/636: Performed by: HOSPITALIST

## 2018-09-01 PROCEDURE — 74011250636 HC RX REV CODE- 250/636: Performed by: NEUROLOGICAL SURGERY

## 2018-09-01 PROCEDURE — 74011000250 HC RX REV CODE- 250: Performed by: NEUROLOGICAL SURGERY

## 2018-09-01 PROCEDURE — 74011250637 HC RX REV CODE- 250/637: Performed by: FAMILY MEDICINE

## 2018-09-01 PROCEDURE — 97116 GAIT TRAINING THERAPY: CPT

## 2018-09-01 PROCEDURE — 74011250637 HC RX REV CODE- 250/637: Performed by: NURSE PRACTITIONER

## 2018-09-01 PROCEDURE — 74011250637 HC RX REV CODE- 250/637: Performed by: NEUROLOGICAL SURGERY

## 2018-09-01 PROCEDURE — 65270000029 HC RM PRIVATE

## 2018-09-01 RX ADMIN — AMLODIPINE BESYLATE 10 MG: 10 TABLET ORAL at 08:53

## 2018-09-01 RX ADMIN — FAMOTIDINE 20 MG: 10 INJECTION, SOLUTION INTRAVENOUS at 21:19

## 2018-09-01 RX ADMIN — SODIUM CHLORIDE AND POTASSIUM CHLORIDE: 9; 1.49 INJECTION, SOLUTION INTRAVENOUS at 15:17

## 2018-09-01 RX ADMIN — MORPHINE SULFATE 2 MG: 2 INJECTION, SOLUTION INTRAMUSCULAR; INTRAVENOUS at 10:15

## 2018-09-01 RX ADMIN — FAMOTIDINE 20 MG: 10 INJECTION, SOLUTION INTRAVENOUS at 08:53

## 2018-09-01 RX ADMIN — DEXAMETHASONE SODIUM PHOSPHATE 4 MG: 4 INJECTION, SOLUTION INTRAMUSCULAR; INTRAVENOUS at 10:15

## 2018-09-01 RX ADMIN — DEXAMETHASONE SODIUM PHOSPHATE 4 MG: 4 INJECTION, SOLUTION INTRAMUSCULAR; INTRAVENOUS at 17:19

## 2018-09-01 RX ADMIN — TOPIRAMATE 50 MG: 25 TABLET, FILM COATED ORAL at 21:19

## 2018-09-01 RX ADMIN — LOSARTAN POTASSIUM 100 MG: 50 TABLET ORAL at 08:53

## 2018-09-01 RX ADMIN — HYDROCHLOROTHIAZIDE 25 MG: 25 TABLET ORAL at 08:53

## 2018-09-01 RX ADMIN — DEXAMETHASONE SODIUM PHOSPHATE 4 MG: 4 INJECTION, SOLUTION INTRAMUSCULAR; INTRAVENOUS at 05:25

## 2018-09-01 RX ADMIN — DOCUSATE SODIUM 100 MG: 100 CAPSULE, LIQUID FILLED ORAL at 17:19

## 2018-09-01 RX ADMIN — METOPROLOL SUCCINATE 25 MG: 25 TABLET, EXTENDED RELEASE ORAL at 21:20

## 2018-09-01 RX ADMIN — DOCUSATE SODIUM 100 MG: 100 CAPSULE, LIQUID FILLED ORAL at 08:53

## 2018-09-01 RX ADMIN — SODIUM CHLORIDE AND POTASSIUM CHLORIDE: 9; 1.49 INJECTION, SOLUTION INTRAVENOUS at 00:01

## 2018-09-01 NOTE — PROGRESS NOTES
Neurosurgery Progress Note;  Post op day 2  S: patient is doing well after elective clipping of a pericollosal aneurysm. Mild headache and dizziness overall feels good  O: afebrile and stable vital signs. Wound is clean and dry alert oreinted x 3 non focal examination   A; doing well  P: mobilize in unit. Pt consult.

## 2018-09-01 NOTE — PROGRESS NOTES
Problem: Falls - Risk of  Goal: *Absence of Falls  Document Jean Paul Fall Risk and appropriate interventions in the flowsheet. Outcome: Progressing Towards Goal  Fall Risk Interventions:  Mobility Interventions: Bed/chair exit alarm, Patient to call before getting OOB    Mentation Interventions: Bed/chair exit alarm, Increase mobility, More frequent rounding, Room close to nurse's station    Medication Interventions: Bed/chair exit alarm, Patient to call before getting OOB    Elimination Interventions: Bed/chair exit alarm, Call light in reach             Problem: Pressure Injury - Risk of  Goal: *Prevention of pressure injury  Document Singh Scale and appropriate interventions in the flowsheet. Outcome: Progressing Towards Goal  Pressure Injury Interventions:  Sensory Interventions: Assess changes in LOC, Keep linens dry and wrinkle-free, Turn and reposition approx.  every two hours (pillows and wedges if needed)         Activity Interventions: Pressure redistribution bed/mattress(bed type), Increase time out of bed    Mobility Interventions: HOB 30 degrees or less, Pressure redistribution bed/mattress (bed type)    Nutrition Interventions: Document food/fluid/supplement intake    Friction and Shear Interventions: HOB 30 degrees or less, Foam dressings/transparent film/skin sealants

## 2018-09-01 NOTE — PROGRESS NOTES
Progress Note      Patient: Angela Regan               Sex: female          DOA: 8/30/2018       YOB: 1975      Age:  44 y.o.        LOS:  LOS: 2 days               Subjective / Interval Hx  I    Angela Regan is a 44 y.o. female  who presents with Complex pericallosal artery aneurysm. She is s/p right frontal craniotomy for complete clipping of pericallosal artery aneurysm. She also has hx HTN and migraine headaches. She is afebrile , denies sob, chest pain. 8/31: Complains of headache and 1 episode of emesis. Now denies nausea. 9/1: Dizziness , headache otherwise no new complaint       Objective:      Visit Vitals    /68    Pulse 86    Temp 98.6 °F (37 °C)    Resp 19    Ht 5' 7.5\" (1.715 m)    Wt 116.6 kg (257 lb 2 oz)    SpO2 96%    BMI 39.68 kg/m2             Physical Exam   Constitutional: She is oriented to person, place, and time. She appears well-developed and well-nourished. No distress. HENT:   Head: Normocephalic. Mouth/Throat: No oropharyngeal exudate. Bandage around surgical area    Eyes: Conjunctivae are normal.   Neck: Neck supple. Cardiovascular: Normal rate, regular rhythm and normal heart sounds. No murmur heard. Pulmonary/Chest: Effort normal and breath sounds normal. No respiratory distress. She has no wheezes. She has no rales. Abdominal: Soft. Bowel sounds are normal.   Musculoskeletal: She exhibits no edema. Neurological: She is alert and oriented to person, place, and time. Skin: Skin is warm. No rash noted. She is not diaphoretic. No erythema. No pallor. Psychiatric: She has a normal mood and affect. Intake and Output:  Current Shift:  09/01 0701 - 09/01 1900  In: 225 [I.V.:225]  Out: 300 [Urine:300]  Last three shifts:  08/30 1901 - 09/01 0700  In: 3859.5 [P.O.:920;  I.V.:2939.5]  Out: 2395 [Urine:2395]    Recent Results (from the past 48 hour(s))   METABOLIC PANEL, COMPREHENSIVE    Collection Time: 08/30/18  6:18 PM   Result Value Ref Range    Sodium 143 136 - 145 mmol/L    Potassium 3.6 3.5 - 5.5 mmol/L    Chloride 109 (H) 100 - 108 mmol/L    CO2 23 21 - 32 mmol/L    Anion gap 11 3.0 - 18 mmol/L    Glucose 182 (H) 74 - 99 mg/dL    BUN 11 7.0 - 18 MG/DL    Creatinine 1.06 0.6 - 1.3 MG/DL    BUN/Creatinine ratio 10 (L) 12 - 20      GFR est AA >60 >60 ml/min/1.73m2    GFR est non-AA 57 (L) >60 ml/min/1.73m2    Calcium 8.5 8.5 - 10.1 MG/DL    Bilirubin, total 0.3 0.2 - 1.0 MG/DL    ALT (SGPT) 15 13 - 56 U/L    AST (SGOT) 13 (L) 15 - 37 U/L    Alk.  phosphatase 96 45 - 117 U/L    Protein, total 7.6 6.4 - 8.2 g/dL    Albumin 3.3 (L) 3.4 - 5.0 g/dL    Globulin 4.3 (H) 2.0 - 4.0 g/dL    A-G Ratio 0.8 0.8 - 1.7     URINALYSIS W/MICROSCOPIC    Collection Time: 08/30/18  8:30 PM   Result Value Ref Range    Color YELLOW      Appearance CLEAR      Specific gravity >1.030 (H) 1.005 - 1.030    pH (UA) 5.5 5.0 - 8.0      Protein NEGATIVE  NEG mg/dL    Glucose NEGATIVE  NEG mg/dL    Ketone NEGATIVE  NEG mg/dL    Bilirubin NEGATIVE  NEG      Blood NEGATIVE  NEG      Urobilinogen 0.2 0.2 - 1.0 EU/dL    Nitrites NEGATIVE  NEG      Leukocyte Esterase NEGATIVE  NEG      WBC 0 to 3 0 - 4 /hpf    RBC 0 to 3 0 - 5 /hpf    Epithelial cells FEW 0 - 5 /lpf    Bacteria NEGATIVE  NEG /hpf   METABOLIC PANEL, BASIC    Collection Time: 08/31/18  3:00 AM   Result Value Ref Range    Sodium 144 136 - 145 mmol/L    Potassium 3.7 3.5 - 5.5 mmol/L    Chloride 109 (H) 100 - 108 mmol/L    CO2 26 21 - 32 mmol/L    Anion gap 9 3.0 - 18 mmol/L    Glucose 137 (H) 74 - 99 mg/dL    BUN 13 7.0 - 18 MG/DL    Creatinine 0.89 0.6 - 1.3 MG/DL    BUN/Creatinine ratio 15 12 - 20      GFR est AA >60 >60 ml/min/1.73m2    GFR est non-AA >60 >60 ml/min/1.73m2    Calcium 8.7 8.5 - 10.1 MG/DL   CBC W/O DIFF    Collection Time: 08/31/18  3:00 AM   Result Value Ref Range    WBC 16.6 (H) 4.6 - 13.2 K/uL    RBC 3.68 (L) 4.20 - 5.30 M/uL    HGB 11.9 (L) 12.0 - 16.0 g/dL    HCT 35.3 35.0 - 45.0 % MCV 95.9 74.0 - 97.0 FL    MCH 32.3 24.0 - 34.0 PG    MCHC 33.7 31.0 - 37.0 g/dL    RDW 14.9 (H) 11.6 - 14.5 %    PLATELET 680 179 - 941 K/uL    MPV 10.1 9.2 - 11.8 FL       Lab/Data Reviewed: All lab results for the last 24 hours reviewed.     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)    Active Problems:    Essential hypertension (12/4/2017)      Obesity, morbid ([facility] [address] (12/5/2017)      Brain aneurysm (8/30/2018)      Leukocytosis (8/31/2018)        PLAN    Pericallosal artery aneurysm  -  s/p right frontal craniotomy for complete clipping of pericallosal artery aneurysm  - Dexamethasone  - BP < 140 per neurosurgery   - pain control as needed for headache   - Neurosurgery following recommend mobilizing patient     HTN   - Cardene prn   - Norvasc , cozaar, HCTZ, Toprol   - goal sbp < 140 per neurosurgery    Leukocytosis  - Likely due to steroids do not think its due to an infective process   - monitor CBC      Hx Migraine HA   - Topamax     Obesity   - advise weight loss and life style changes    DVT prophylaxis - SCD's    GI prophylaxis - Pepcid     Full code     eMlani Nascimento MD  September 1, 2018

## 2018-09-01 NOTE — PROGRESS NOTES
Problem: Falls - Risk of  Goal: *Absence of Falls  Document Jean Paul Fall Risk and appropriate interventions in the flowsheet. Outcome: Progressing Towards Goal  Fall Risk Interventions:  Mobility Interventions: Bed/chair exit alarm, Patient to call before getting OOB    Mentation Interventions: Bed/chair exit alarm, Room close to nurse's station, Increase mobility, More frequent rounding    Medication Interventions: Patient to call before getting OOB, Teach patient to arise slowly, Bed/chair exit alarm    Elimination Interventions: Bed/chair exit alarm, Call light in reach             Problem: Pressure Injury - Risk of  Goal: *Prevention of pressure injury  Document Singh Scale and appropriate interventions in the flowsheet. Outcome: Progressing Towards Goal  Pressure Injury Interventions:  Sensory Interventions: Assess changes in LOC, Keep linens dry and wrinkle-free, Turn and reposition approx.  every two hours (pillows and wedges if needed)         Activity Interventions: Assess need for specialty bed, Increase time out of bed    Mobility Interventions: HOB 30 degrees or less, Pressure redistribution bed/mattress (bed type)    Nutrition Interventions: Document food/fluid/supplement intake    Friction and Shear Interventions: HOB 30 degrees or less, Lift sheet, Minimize layers

## 2018-09-01 NOTE — PROGRESS NOTES
NEUROSURGERY PROGRESS NOTE     SURGERY DATE: 8/30/2018              POD# 2  Dx: Aneurysm, cerebral [I67.1]        SUBJECTIVE:  Feels well, OOB some  CT yesterday with just post op changes      EXAM:    Vitals:    09/01/18 0905 09/01/18 0912 09/01/18 0930 09/01/18 1000   BP: (!) 155/98 (!) 143/96 148/82 121/68   Pulse: 82 81 91 86   Resp: 18 21 21 19   Temp:       SpO2: 97% 97% 98% 96%   Weight:       Height:           Alert and appropriate. CN intact,  Motor and sensory function are intact. The wound dressing is clean/dry/intact. LABS:   No results found for this or any previous visit (from the past 24 hour(s)).          IMPRESSION: Normal post-operative convalescence    PLAN:    Mobilize with PT/OT  Transfer to floor  Alayna Vitale MD  September 1, 2018  12:11 PM

## 2018-09-01 NOTE — PROGRESS NOTES
Physical Exam   Skin:             1557 - TRANSFER - IN REPORT:    Verbal report received from [address]  , RN(name) [facility] [place]  being received from ICU(unit) for routine progression of care      Report consisted of patients Situation, Background, Assessment and   Recommendations(SBAR). Information from the following report(s) SBAR, Kardex, Intake/Output and MAR was reviewed with the receiving nurse. Opportunity for questions and clarification was provided. 1622 - admission assessment performed. Pt sitting up in chair. VS taken by this RN, dual neuro assessment performed w/offgoing nurse, NAD noted, will cont to monitor. 1925 - Bedside and Verbal shift change report given to Jennifer Blanchard RN (oncoming nurse) by Nadiya Coffman RN (offgoing nurse). Report included the following information SBAR, Kardex, Intake/Output and MAR.

## 2018-09-01 NOTE — PROGRESS NOTES
Acknowledged duplicate PT orders. Patient already on PT caseload. Please refer to PT evaluation for discharge recommendations and plan of care.      Thank you,    Anastacio Willson PT, DPT  Office extension: [phone]  Pager #: [phone]

## 2018-09-02 VITALS
HEART RATE: 84 BPM | HEIGHT: 68 IN | DIASTOLIC BLOOD PRESSURE: 87 MMHG | BODY MASS INDEX: 38.97 KG/M2 | TEMPERATURE: 98.1 F | RESPIRATION RATE: 14 BRPM | OXYGEN SATURATION: 95 % | SYSTOLIC BLOOD PRESSURE: 124 MMHG | WEIGHT: 257.13 LBS

## 2018-09-02 LAB
ANION GAP SERPL CALC-SCNC: 7 MMOL/L (ref 3–18)
APPEARANCE UR: ABNORMAL
BACTERIA URNS QL MICRO: ABNORMAL /HPF
BILIRUB UR QL: NEGATIVE
BUN SERPL-MCNC: 12 MG/DL (ref 7–18)
BUN/CREAT SERPL: 16 (ref 12–20)
CALCIUM SERPL-MCNC: 9 MG/DL (ref 8.5–10.1)
CHLORIDE SERPL-SCNC: 108 MMOL/L (ref 100–108)
CO2 SERPL-SCNC: 28 MMOL/L (ref 21–32)
COLOR UR: YELLOW
CREAT SERPL-MCNC: 0.74 MG/DL (ref 0.6–1.3)
EPITH CASTS URNS QL MICRO: ABNORMAL /LPF (ref 0–5)
ERYTHROCYTE [DISTWIDTH] IN BLOOD BY AUTOMATED COUNT: 15.3 % (ref 11.6–14.5)
GLUCOSE SERPL-MCNC: 162 MG/DL (ref 74–99)
GLUCOSE UR STRIP.AUTO-MCNC: NEGATIVE MG/DL
HCT VFR BLD AUTO: 37.1 % (ref 35–45)
HGB BLD-MCNC: 12.5 G/DL (ref 12–16)
HGB UR QL STRIP: NEGATIVE
KETONES UR QL STRIP.AUTO: NEGATIVE MG/DL
LEUKOCYTE ESTERASE UR QL STRIP.AUTO: NEGATIVE
MCH RBC QN AUTO: 32.6 PG (ref 24–34)
MCHC RBC AUTO-ENTMCNC: 33.7 G/DL (ref 31–37)
MCV RBC AUTO: 96.9 FL (ref 74–97)
NITRITE UR QL STRIP.AUTO: NEGATIVE
PH UR STRIP: 8 [PH] (ref 5–8)
PLATELET # BLD AUTO: 335 K/UL (ref 135–420)
PMV BLD AUTO: 9.9 FL (ref 9.2–11.8)
POTASSIUM SERPL-SCNC: 4.1 MMOL/L (ref 3.5–5.5)
PROT UR STRIP-MCNC: NEGATIVE MG/DL
RBC # BLD AUTO: 3.83 M/UL (ref 4.2–5.3)
RBC #/AREA URNS HPF: ABNORMAL /HPF (ref 0–5)
SODIUM SERPL-SCNC: 143 MMOL/L (ref 136–145)
SP GR UR REFRACTOMETRY: 1.01 (ref 1–1.03)
UROBILINOGEN UR QL STRIP.AUTO: 1 EU/DL (ref 0.2–1)
WBC # BLD AUTO: 21.4 K/UL (ref 4.6–13.2)
WBC URNS QL MICRO: ABNORMAL /HPF (ref 0–4)

## 2018-09-02 PROCEDURE — 74011000250 HC RX REV CODE- 250: Performed by: NEUROLOGICAL SURGERY

## 2018-09-02 PROCEDURE — 74011250637 HC RX REV CODE- 250/637: Performed by: NEUROLOGICAL SURGERY

## 2018-09-02 PROCEDURE — 81001 URINALYSIS AUTO W/SCOPE: CPT | Performed by: FAMILY MEDICINE

## 2018-09-02 PROCEDURE — 36415 COLL VENOUS BLD VENIPUNCTURE: CPT | Performed by: FAMILY MEDICINE

## 2018-09-02 PROCEDURE — 85027 COMPLETE CBC AUTOMATED: CPT | Performed by: FAMILY MEDICINE

## 2018-09-02 PROCEDURE — 74011250636 HC RX REV CODE- 250/636: Performed by: NEUROLOGICAL SURGERY

## 2018-09-02 PROCEDURE — 80048 BASIC METABOLIC PNL TOTAL CA: CPT | Performed by: FAMILY MEDICINE

## 2018-09-02 RX ORDER — FAMOTIDINE 20 MG/1
20 TABLET, FILM COATED ORAL 2 TIMES DAILY
Status: DISCONTINUED | OUTPATIENT
Start: 2018-09-02 | End: 2018-09-02 | Stop reason: HOSPADM

## 2018-09-02 RX ORDER — METHYLPREDNISOLONE 4 MG/1
TABLET ORAL
Qty: 1 DOSE PACK | Refills: 0 | Status: SHIPPED | OUTPATIENT
Start: 2018-09-02 | End: 2019-05-31 | Stop reason: ALTCHOICE

## 2018-09-02 RX ORDER — HYDROCODONE BITARTRATE AND ACETAMINOPHEN 5; 325 MG/1; MG/1
1-2 TABLET ORAL
Qty: 40 TAB | Refills: 0 | Status: SHIPPED | OUTPATIENT
Start: 2018-09-02 | End: 2019-05-31 | Stop reason: ALTCHOICE

## 2018-09-02 RX ADMIN — DEXAMETHASONE SODIUM PHOSPHATE 4 MG: 4 INJECTION, SOLUTION INTRAMUSCULAR; INTRAVENOUS at 06:12

## 2018-09-02 RX ADMIN — AMLODIPINE BESYLATE 10 MG: 10 TABLET ORAL at 10:38

## 2018-09-02 RX ADMIN — DEXAMETHASONE SODIUM PHOSPHATE 4 MG: 4 INJECTION, SOLUTION INTRAMUSCULAR; INTRAVENOUS at 11:02

## 2018-09-02 RX ADMIN — DEXAMETHASONE SODIUM PHOSPHATE 4 MG: 4 INJECTION, SOLUTION INTRAMUSCULAR; INTRAVENOUS at 00:09

## 2018-09-02 RX ADMIN — HYDROCODONE BITARTRATE AND ACETAMINOPHEN 1 TABLET: 5; 325 TABLET ORAL at 06:21

## 2018-09-02 RX ADMIN — METOPROLOL TARTRATE 2.5 MG: 1 INJECTION, SOLUTION INTRAVENOUS at 08:05

## 2018-09-02 RX ADMIN — DEXAMETHASONE SODIUM PHOSPHATE 4 MG: 4 INJECTION, SOLUTION INTRAMUSCULAR; INTRAVENOUS at 20:03

## 2018-09-02 RX ADMIN — HYDROCHLOROTHIAZIDE 25 MG: 25 TABLET ORAL at 10:36

## 2018-09-02 RX ADMIN — FAMOTIDINE 20 MG: 20 TABLET ORAL at 11:01

## 2018-09-02 RX ADMIN — LOSARTAN POTASSIUM 100 MG: 50 TABLET ORAL at 10:36

## 2018-09-02 NOTE — DISCHARGE SUMMARY
Discharge Summary    PATIENT ID:  Patricia Baca  44 y.o.  1975    ADMITTING PHYSICIAN:  Roderick Melton MD  ADMIT DATE: 8/30/2018   DISCHARGE DATE:  9/2/18      DISCHARGE DIAGNOSIS:  Pericallosal artery aneurysm    OPERATIVE PROCEDURES:  R frontal craniotomy for complete clipping of Pericallosal artery aneurysm and intraop angio      HOSPITAL COURSE:  Tolerated the operative procedure well and was taken to NICU and then to the floor. As of d/c the patient is ambulating, tolerating p.o., and voiding without difficulty. No neuro deficits. WBC elevated given post op period and decadron. UA -ve      PHYSICAL EXAM:  Visit Vitals    /84 (BP 1 Location: Left arm, BP Patient Position: At rest)    Pulse 87    Temp 97.9 °F (36.6 °C)    Resp 14    Ht 5' 7.5\" (1.715 m)    Wt 116.6 kg (257 lb 2 oz)    SpO2 95%    BMI 39.68 kg/m2       Alert and appropriate. CN intact, no drift, Motor and sensory function are grossly intact. The wound is clean/dry/intact and flat.     RELAVENT LABS ( within last 72 hrs):    Recent Results (from the past 72 hour(s))   URINALYSIS W/MICROSCOPIC    Collection Time: 08/30/18  8:30 PM   Result Value Ref Range    Color YELLOW      Appearance CLEAR      Specific gravity >1.030 (H) 1.005 - 1.030    pH (UA) 5.5 5.0 - 8.0      Protein NEGATIVE  NEG mg/dL    Glucose NEGATIVE  NEG mg/dL    Ketone NEGATIVE  NEG mg/dL    Bilirubin NEGATIVE  NEG      Blood NEGATIVE  NEG      Urobilinogen 0.2 0.2 - 1.0 EU/dL    Nitrites NEGATIVE  NEG      Leukocyte Esterase NEGATIVE  NEG      WBC 0 to 3 0 - 4 /hpf    RBC 0 to 3 0 - 5 /hpf    Epithelial cells FEW 0 - 5 /lpf    Bacteria NEGATIVE  NEG /hpf   METABOLIC PANEL, BASIC    Collection Time: 08/31/18  3:00 AM   Result Value Ref Range    Sodium 144 136 - 145 mmol/L    Potassium 3.7 3.5 - 5.5 mmol/L    Chloride 109 (H) 100 - 108 mmol/L    CO2 26 21 - 32 mmol/L    Anion gap 9 3.0 - 18 mmol/L    Glucose 137 (H) 74 - 99 mg/dL    BUN 13 7.0 - 18 MG/DL Creatinine 0.89 0.6 - 1.3 MG/DL    BUN/Creatinine ratio 15 12 - 20      GFR est AA >60 >60 ml/min/1.73m2    GFR est non-AA >60 >60 ml/min/1.73m2    Calcium 8.7 8.5 - 10.1 MG/DL   CBC W/O DIFF    Collection Time: 08/31/18  3:00 AM   Result Value Ref Range    WBC 16.6 (H) 4.6 - 13.2 K/uL    RBC 3.68 (L) 4.20 - 5.30 M/uL    HGB 11.9 (L) 12.0 - 16.0 g/dL    HCT 35.3 35.0 - 45.0 %    MCV 95.9 74.0 - 97.0 FL    MCH 32.3 24.0 - 34.0 PG    MCHC 33.7 31.0 - 37.0 g/dL    RDW 14.9 (H) 11.6 - 14.5 %    PLATELET 135 050 - 396 K/uL    MPV 10.1 9.2 - 11.8 FL   CBC W/O DIFF    Collection Time: 09/02/18  9:00 AM   Result Value Ref Range    WBC 21.4 (H) 4.6 - 13.2 K/uL    RBC 3.83 (L) 4.20 - 5.30 M/uL    HGB 12.5 12.0 - 16.0 g/dL    HCT 37.1 35.0 - 45.0 %    MCV 96.9 74.0 - 97.0 FL    MCH 32.6 24.0 - 34.0 PG    MCHC 33.7 31.0 - 37.0 g/dL    RDW 15.3 (H) 11.6 - 14.5 %    PLATELET 699 439 - 285 K/uL    MPV 9.9 9.2 - 12.1 FL   METABOLIC PANEL, BASIC    Collection Time: 09/02/18  9:00 AM   Result Value Ref Range    Sodium 143 136 - 145 mmol/L    Potassium 4.1 3.5 - 5.5 mmol/L    Chloride 108 100 - 108 mmol/L    CO2 28 21 - 32 mmol/L    Anion gap 7 3.0 - 18 mmol/L    Glucose 162 (H) 74 - 99 mg/dL    BUN 12 7.0 - 18 MG/DL    Creatinine 0.74 0.6 - 1.3 MG/DL    BUN/Creatinine ratio 16 12 - 20      GFR est AA >60 >60 ml/min/1.73m2    GFR est non-AA >60 >60 ml/min/1.73m2    Calcium 9.0 8.5 - 10.1 MG/DL   URINALYSIS W/MICROSCOPIC    Collection Time: 09/02/18  2:15 PM   Result Value Ref Range    Color YELLOW      Appearance CLOUDY      Specific gravity 1.015 1.005 - 1.030      pH (UA) 8.0 5.0 - 8.0      Protein NEGATIVE  NEG mg/dL    Glucose NEGATIVE  NEG mg/dL    Ketone NEGATIVE  NEG mg/dL    Bilirubin NEGATIVE  NEG      Blood NEGATIVE  NEG      Urobilinogen 1.0 0.2 - 1.0 EU/dL    Nitrites NEGATIVE  NEG      Leukocyte Esterase NEGATIVE  NEG      WBC PENDING /hpf    RBC PENDING /hpf    Epithelial cells PENDING /lpf    Bacteria PENDING /hpf CONDITION AT DISCHARGE: Afebrile, Ambulating, Eating, Drinking, Voiding, Stable    Current Discharge Medication List      CONTINUE these medications which have NOT CHANGED    Details   topiramate (TOPAMAX) 50 mg tablet Refills: 6      amLODIPine (NORVASC) 10 mg tablet Take  by mouth daily. metoprolol succinate (TOPROL-XL) 25 mg XL tablet Take 25 mg by mouth daily. losartan-hydroCHLOROthiazide (HYZAAR) 100-25 mg per tablet Take 1 Tab by mouth daily.                  DISPOSITION:  [unfilled]   -F/u 9/10 with  (the patient should call for the appointment     [phone])   -Activity instructions have been given    CONSULTANTS:    .    PCP:  David Bob MD    DISCHARGING PHYSICIAN: MD Cherie Pacheco MD  September 2, 2018  6:42 PM

## 2018-09-02 NOTE — PROGRESS NOTES
Assumed care of pt from [facility]. Alert and oriented x 4. Report included SBAR, MAR, kardex. Frequent use items within reach. Bed locked in lowest position. Call bell within reach. Pt verbalizes understanding to call for assistance. 2200 up sitting in chair. 0000 sitting in chair. 0200 in bathroom  Medicated for pain  0400 up to bathroom without difficulty. 0800 medicated for elevated BP.  0857 Bedside shift change report given to Martina Bone RN (oncoming nurse) by Clement Henley (offgoing nurse). Report included the following information SBAR, Kardex and MAR.

## 2018-09-02 NOTE — PROGRESS NOTES
Neurosurgery Progress NOte;  Post op day 3  S: overall doing well, urinalysis is unremarkable, white count is up to 21,000, dizziness has resolved. No nausea, urgency is less  O; afebrile elevated white count. Alert oriented x 3 non focal examination  A; doing well  P; recheck wbc tomorrow.  Mobilize with pt

## 2018-09-02 NOTE — PROGRESS NOTES
Dorn Olszewski provided for patient upon discharge. Patient armband removed and shredded. 2004: Reviewed discharge instructions with patient and primary nurse, family present, educated with patient permission. Patient given scripts. Primary nurse assisting patient at this time.

## 2018-09-02 NOTE — PROGRESS NOTES
Progress Note      Patient: Lesli Werner               Sex: female          DOA: 8/30/2018       YOB: 1975      Age:  44 y.o.        LOS:  LOS: 3 days               Subjective / Interval Hx  I    Lesli Werner is a 44 y.o. female  who presents with Complex pericallosal artery aneurysm. She is s/p right frontal craniotomy for complete clipping of pericallosal artery aneurysm. She also has hx HTN and migraine headaches. She is afebrile , denies sob, chest pain. 8/31: Complains of headache and 1 episode of emesis. Now denies nausea. 9/1: Dizziness , headache otherwise no new complaint   9/2: c/o urinary urgency denies dysuria . Will get UA today       Objective:      Visit Vitals    BP (!) 152/96 (BP 1 Location: Right arm, BP Patient Position: At rest)    Pulse 73    Temp 98 °F (36.7 °C)    Resp 14    Ht 5' 7.5\" (1.715 m)    Wt 116.6 kg (257 lb 2 oz)    SpO2 97%    BMI 39.68 kg/m2             Physical Exam   Constitutional: She is oriented to person, place, and time. She appears well-developed and well-nourished. No distress. HENT:   Head: Normocephalic. Mouth/Throat: No oropharyngeal exudate. Bandage around surgical area    Eyes: Conjunctivae are normal.   Neck: Neck supple. Cardiovascular: Normal rate, regular rhythm and normal heart sounds. No murmur heard. Pulmonary/Chest: Effort normal and breath sounds normal. No respiratory distress. She has no wheezes. She has no rales. Abdominal: Soft. Bowel sounds are normal.   Musculoskeletal: She exhibits no edema. Neurological: She is alert and oriented to person, place, and time. Skin: Skin is warm. No rash noted. She is not diaphoretic. No erythema. No pallor. Psychiatric: She has a normal mood and affect. Intake and Output:  Current Shift:     Last three shifts:  08/31 1901 - 09/02 0700  In: 9385 [P.O.:240;  I.V.:1570]  Out: 4299 [Urine:2775]    No results found for this or any previous visit (from the past 50 hour(s)). Lab/Data Reviewed: All lab results for the last 24 hours reviewed.     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)    Active Problems:    Essential hypertension (12/4/2017)      Obesity, morbid ([address] (12/5/2017)      Brain aneurysm (8/30/2018)      Leukocytosis (8/31/2018)      Urinary Urgency     PLAN    Pericallosal artery aneurysm  -  s/p right frontal craniotomy for complete clipping of pericallosal artery aneurysm  - Dexamethasone  - BP < 140 per neurosurgery   - pain control as needed for headache   - Neurosurgery following recommend PT/OT     HTN   - Cardene prn   - Norvasc , cozaar, HCTZ, Toprol   - goal sbp < 140 per neurosurgery  - monitor BP closely    Leukocytosis  - Likely due to steroids do not think its due to an infective process   - monitor CBC      Hx Migraine HA   - Topamax     Obesity   - advise weight loss and life style changes    DVT prophylaxis - SCD's    GI prophylaxis - Pepcid     Urinary Urgency   - r/o UTI , UA ordered    Full code     Brittany Calderon MD  September 2, 2018

## 2018-09-02 NOTE — PROGRESS NOTES
Problem: Falls - Risk of  Goal: *Absence of Falls  Document Jean Paul Fall Risk and appropriate interventions in the flowsheet. Outcome: Progressing Towards Goal  Fall Risk Interventions:  Mobility Interventions: Communicate number of staff needed for ambulation/transfer, Strengthening exercises (ROM-active/passive), PT Consult for mobility concerns    Mentation Interventions: Bed/chair exit alarm, Door open when patient unattended, Increase mobility, More frequent rounding    Medication Interventions: Evaluate medications/consider consulting pharmacy    Elimination Interventions: Patient to call for help with toileting needs             Problem: Pressure Injury - Risk of  Goal: *Prevention of pressure injury  Document Singh Scale and appropriate interventions in the flowsheet.    Outcome: Progressing Towards Goal  Pressure Injury Interventions:  Sensory Interventions: Assess changes in LOC, Pressure redistribution bed/mattress (bed type)         Activity Interventions: Pressure redistribution bed/mattress(bed type)    Mobility Interventions: Pressure redistribution bed/mattress (bed type), PT/OT evaluation    Nutrition Interventions: Document food/fluid/supplement intake    Friction and Shear Interventions: Apply protective barrier, creams and emollients, Foam dressings/transparent film/skin sealants

## 2018-09-02 NOTE — PROGRESS NOTES
Clinical Pharmacy Note: IV to PO Automatic Conversion    Please note: Viviana Su medication (famotidine 20 mg) has been changed from IV to PO based on the following critiera:    - Patient is taking scheduled oral medications  - Patient is tolerating tube feeds at goal rate or a full liquid, soft or regular diet    This IV to PO conversion is based on the [employer] approved automatic conversion policy for eligible patients. Please call with questions.     Thanks,  Monika Leong, PHARMD

## 2018-09-03 NOTE — DISCHARGE INSTRUCTIONS
DISCHARGE SUMMARY from Nurse    PATIENT INSTRUCTIONS:    After general anesthesia or intravenous sedation, for 24 hours or while taking prescription Narcotics:  · Limit your activities  · Do not drive and operate hazardous machinery  · Do not make important personal or business decisions  · Do  not drink alcoholic beverages  · If you have not urinated within 8 hours after discharge, please contact your surgeon on call. Report the following to your surgeon:  · Excessive pain, swelling, redness or odor of or around the surgical area  · Temperature over 100.5  · Nausea and vomiting lasting longer than 4 hours or if unable to take medications  · Any signs of decreased circulation or nerve impairment to extremity: change in color, persistent  numbness, tingling, coldness or increase pain  · Any questions    What to do at Home:  Recommended activity: Activity as tolerated, and per Dr. Cristina Cruz    If you experience any of the following symptoms redness, drainage of incision, unresolved pain, change in condition, please follow up with Dr. Gentry Gray office. *  Please give a list of your current medications to your Primary Care Provider. *  Please update this list whenever your medications are discontinued, doses are      changed, or new medications (including over-the-counter products) are added. *  Please carry medication information at all times in case of emergency situations. These are general instructions for a healthy lifestyle:    No smoking/ No tobacco products/ Avoid exposure to second hand smoke  Surgeon General's Warning:  Quitting smoking now greatly reduces serious risk to your health.     Obesity, smoking, and sedentary lifestyle greatly increases your risk for illness    A healthy diet, regular physical exercise & weight monitoring are important for maintaining a healthy lifestyle    You may be retaining fluid if you have a history of heart failure or if you experience any of the following symptoms: Weight gain of 3 pounds or more overnight or 5 pounds in a week, increased swelling in our hands or feet or shortness of breath while lying flat in bed. Please call your doctor as soon as you notice any of these symptoms; do not wait until your next office visit. Recognize signs and symptoms of STROKE:    F-face looks uneven    A-arms unable to move or move unevenly    S-speech slurred or non-existent    T-time-call 911 as soon as signs and symptoms begin-DO NOT go       Back to bed or wait to see if you get better-TIME IS BRAIN. Warning Signs of HEART ATTACK     Call 911 if you have these symptoms:   Chest discomfort. Most heart attacks involve discomfort in the center of the chest that lasts more than a few minutes, or that goes away and comes back. It can feel like uncomfortable pressure, squeezing, fullness, or pain.  Discomfort in other areas of the upper body. Symptoms can include pain or discomfort in one or both arms, the back, neck, jaw, or stomach.  Shortness of breath with or without chest discomfort.  Other signs may include breaking out in a cold sweat, nausea, or lightheadedness. Don't wait more than five minutes to call 911 - MINUTES MATTER! Fast action can save your life. Calling 911 is almost always the fastest way to get lifesaving treatment. Emergency Medical Services staff can begin treatment when they arrive -- up to an hour sooner than if someone gets to the hospital by car. The discharge information has been reviewed with the patient. The patient verbalized understanding. Discharge medications reviewed with the patient and appropriate educational materials and side effects teaching were provided. ___________________________________________________________________________________________________________________________________    Discharge Instructions:   With your recent surgery it is not unusual to experience some pain or discomfort in the area of previous pain or the incision.  Accordingly, you have been given pain medication for your comfort until the healing process is further along.  As time progresses, you should notice the frequency and the intensity of your discomfort steadily decrease.  Here are some simple post-operative instructions to help during your home convalescence. Use your pain medication as directed.  Refills should be requested during regular office hours and not on weekends by calling [phone] x [phone]. Continue any regular medicine for other conditions (e.g. blood pressure, diabetes, heart, heart problems, etc.)    You may ride in a car for short trips.  Dr. Evita Jeffries will discuss with you when you can drive. No bending, lifting or strenuous activities.  If you need to get to the floor, squat instead of bending. Showers are fine and you may wash your hair. Avoid exercises except for walking.  Begin with frequent, but short, periods of walking and gradually build up to longer periods of time. Sit for short periods of time (10-15 minutes) in the first week after surgery. You may resume sexual relations as comfort level allows. Notify us if you develop fever, painful redness around the incision or drainage from the wound.     Call and schedule your follow-up appointment with your doctor at [phone] x [phone]    If you have any questions, please contact our office at [phone]

## 2019-05-31 ENCOUNTER — OFFICE VISIT (OUTPATIENT)
Dept: FAMILY MEDICINE CLINIC | Age: 44
End: 2019-05-31

## 2019-05-31 VITALS
RESPIRATION RATE: 17 BRPM | OXYGEN SATURATION: 98 % | HEIGHT: 68 IN | DIASTOLIC BLOOD PRESSURE: 90 MMHG | TEMPERATURE: 97.9 F | SYSTOLIC BLOOD PRESSURE: 136 MMHG | BODY MASS INDEX: 40.44 KG/M2 | HEART RATE: 81 BPM | WEIGHT: 266.8 LBS

## 2019-05-31 DIAGNOSIS — I67.1 CEREBRAL ANEURYSM WITHOUT RUPTURE: ICD-10-CM

## 2019-05-31 DIAGNOSIS — Z90.81 H/O SPLENECTOMY: ICD-10-CM

## 2019-05-31 DIAGNOSIS — Z85.07 HISTORY OF PANCREATIC CANCER: ICD-10-CM

## 2019-05-31 DIAGNOSIS — Z23 ENCOUNTER FOR IMMUNIZATION: ICD-10-CM

## 2019-05-31 DIAGNOSIS — I10 ESSENTIAL HYPERTENSION: Primary | ICD-10-CM

## 2019-05-31 DIAGNOSIS — R73.9 ELEVATED BLOOD SUGAR: ICD-10-CM

## 2019-05-31 PROBLEM — D72.829 LEUKOCYTOSIS: Status: RESOLVED | Noted: 2018-08-31 | Resolved: 2019-05-31

## 2019-05-31 PROBLEM — Z90.3 S/P PARTIAL GASTRECTOMY: Status: ACTIVE | Noted: 2019-05-31

## 2019-05-31 RX ORDER — SPIRONOLACTONE 50 MG/1
50 TABLET, FILM COATED ORAL DAILY
Qty: 90 TAB | Refills: 0 | Status: SHIPPED | OUTPATIENT
Start: 2019-05-31 | End: 2019-07-26 | Stop reason: SDUPTHER

## 2019-05-31 RX ORDER — TOPIRAMATE 50 MG/1
50 TABLET, FILM COATED ORAL 2 TIMES DAILY
Refills: 6 | COMMUNITY
Start: 2019-05-31

## 2019-05-31 RX ORDER — LOSARTAN POTASSIUM AND HYDROCHLOROTHIAZIDE 25; 100 MG/1; MG/1
1 TABLET ORAL DAILY
Qty: 90 TAB | Refills: 0 | Status: SHIPPED | OUTPATIENT
Start: 2019-05-31 | End: 2019-06-27 | Stop reason: ALTCHOICE

## 2019-05-31 NOTE — PROGRESS NOTES
Assessment/Plan:    Diagnoses and all orders for this visit:    1. Essential hypertension- stop metoprolol. Add sprinolactone. Labs in 3 weeks to ck Cr and K.  F/u in 1mo. If uncontrolled change hctz to chlorthalidone. She does have pitting edema and ultimately would like to titrate norvasc down to 5mg if we can get bp controlled. -     CBC W/O DIFF; Future  -     METABOLIC PANEL, COMPREHENSIVE; Future  -     LIPID PANEL; Future  -     spironolactone (ALDACTONE) 50 mg tablet; Take 1 Tab by mouth daily. -     losartan-hydroCHLOROthiazide (HYZAAR) 100-25 mg per tablet; Take 1 Tab by mouth daily. 2. Elevated blood sugar- at risk for DM due to partial pancreatectomy and obesity  -     HEMOGLOBIN A1C W/O EAG; Future    3. History of pancreatic cancer- s/p partial resection    4. Encounter for immunization  -     PNEUMOCOCCAL CONJ VACCINE 13 VALENT IM  -     [place] IMMUNIZ ADMIN,1 SINGLE/COMB VAC/TOXOID    5. Cerebral aneurysm - followed by Dr. Chavo Collier. The plan was discussed with the patient. The patient verbalized understanding and is in agreement with the plan. All medication potential side effects were discussed with the patient. Health Maintenance:   Health Maintenance   Topic Date Due    MenB Meningococcal topic (1 of 2 - Risk Bexsero 2-dose series) 04/07/1986    DTaP/Tdap/Td series (1 - Tdap) 04/07/1997    Pneumococcal 0-64 years (3 of 3 - PPSV23) 07/26/2019    Influenza Age 9 to Adult  08/01/2019    PAP AKA CERVICAL CYTOLOGY  01/01/2021       Audrey Carmona is a 38 y.o.  female and presents with Establish Care     Subjective:  Pt is here to establish care. Has h/ocerebral aneurysms, s/p clipping of one 8/2018 by Dr. Hua Mckenna. Followed by Dr. Tony Check for the residual aneurysm with angiogram planned. 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 HTN - not all that well controlled. Tolerating meds well. ROS:  Constitutional: No recent weight change. No weakness/fatigue. No f/c. Skin: No rashes, change in nails/hair, itching   HENT: No HA, dizziness. No hearing loss/tinnitus. No nasal congestion/discharge. Eyes: No change in vision, double/blurred vision or eye pain/redness. Cardiovascular: No CP/palpitations. No ORANTES/orthopnea/PND. Respiratory: No cough/sputum, dyspnea, wheezing. Gastointestinal: No dysphagia, reflux. No n/v. No constipation/diarrhea. No melena/rectal bleeding. Genitourinary: No dysuria, urinary hesitancy, nocturia, hematuria. No incontinence. Musculoskeletal: No joint pain/stiffness. No muscle pain/tenderness. Endo: No heat/cold intolerance, no polyuria/polydypsia. Heme: No h/o anemia. No easy bleeding/bruising. Allergy/Immunology: No seasonal rhinitis. Denies frequent colds, sinus/ear infections. Neurological: No seizures/numbness/weakness. No paresthesias. Psychiatric:  No depression, anxiety.    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       PSH:  Past Surgical History:   Procedure Laterality Date    HX CRANIOTOMY      aneurysm clipping    HX DILATION AND CURETTAGE      HX GI  2004    partial pancreas, stomach removed    HX GI  2010    spleen removal     HX HYSTEROSCOPY WITH ENDOMETRIAL ABLATION      HX SPLENECTOMY      HX SPLENECTOMY          SH:  Social History     Tobacco Use    Smoking status: Never Smoker    Smokeless tobacco: Never Used   Substance Use Topics    Alcohol use: No    Drug use: No       FH:  Family History   Problem Relation Age of Onset    Diabetes Maternal Aunt     Hypertension Maternal Aunt     Diabetes Maternal Uncle     Hypertension Maternal Uncle     Hypertension Mother     Diabetes Mother     Heart Attack Mother    [facility] Other Maternal Grandmother        Medications/Allergies:    Current Outpatient Medications:     topiramate (TOPAMAX) 50 mg tablet, , Disp: , Rfl: 6    amLODIPine (NORVASC) 10 mg tablet, Take  by mouth daily. , Disp: , Rfl:     metoprolol succinate (TOPROL-XL) 25 mg XL tablet, Take 25 mg by mouth daily. , Disp: , Rfl:     losartan-hydroCHLOROthiazide (HYZAAR) 100-25 mg per tablet, Take 1 Tab by mouth daily. , Disp: , Rfl:   Allergies   Allergen Reactions    Zofran (As Hydrochloride) [Ondansetron Hcl] Anxiety     agitation       Objective:  Visit Vitals  /90 (BP 1 Location: Left arm, BP Patient Position: Sitting)   Pulse 81   Temp 97.9 °F (36.6 °C) (Oral)   Resp 17   Ht 5' 7.5\" (1.715 m)   Wt 266 lb 12.8 oz (121 kg)   SpO2 98%   BMI 41.17 kg/m²      Constitutional: Well developed, nourished, no distress, alert, obese habitus   HENT: Exterior ears and tympanic membranes normal bilaterally. Supple neck. No thyromegaly or lymphadenopathy. Oropharynx clear and moist mucous membranes. Eyes: Conjunctiva normal. PERRL. Cardiovascular: S1, S2.  RRR. No murmurs/rubs. No thrills palpated. No carotid bruits. Intact distal pulses. 1+ pitting pretibial edema. Pulmonary/Chest Wall: No abnormalities on inspection. Clear to auscultation bilaterally. No wheezing/rhonchi. Normal effort. GI: Soft, nontender, nondistended. Normal active bowel sounds. No  masses on palpation. No hepatosplenomegaly. Musculoskeletal: Gait normal.  Joints without deformity/tenderness. Neurological: Appropriate. No focal motor or sensory deficits. Speech normal.   Skin: No lesions/rashes on inspection. Psych: Appropriate affect, judgement and insight. Short-term memory intact.

## 2019-05-31 NOTE — PROGRESS NOTES
Prevnar 13 administered in the left deltoid  5/31/2019 by Taiwo Timmons LPN with consent. Patient tolerated procedure well. With no bleeding observed at injection site. No reactions noted.

## 2019-05-31 NOTE — PROGRESS NOTES
Colonel Sheffield is a 38 y.o. female (: 1975) presenting to address:    Chief Complaint   Patient presents with    Establish Care       Vitals:    19 1011   BP: 136/90   Pulse: 81   Resp: 17   Temp: 97.9 °F (36.6 °C)   TempSrc: Oral   SpO2: 98%   Weight: 266 lb 12.8 oz (121 kg)   Height: 5' 7.5\" (1.715 m)   PainSc:   4   PainLoc: Head       Hearing/Vision:      Visual Acuity Screening    Right eye Left eye Both eyes   Without correction: 20/40  20/40   With correction:  20/200        Learning Assessment:     Learning Assessment 2019   PRIMARY LEARNER Patient   HIGHEST LEVEL OF EDUCATION - PRIMARY LEARNER  4 YEARS OF COLLEGE   BARRIERS PRIMARY LEARNER NONE   CO-LEARNER CAREGIVER No   PRIMARY LANGUAGE ENGLISH    NEED No   LEARNER PREFERENCE PRIMARY DEMONSTRATION   ANSWERED BY self   RELATIONSHIP SELF     Depression Screening:   No flowsheet data found. Fall Risk Assessment:   No flowsheet data found. Abuse Screening:     Abuse Screening Questionnaire 2019   Do you ever feel afraid of your partner? N   Are you in a relationship with someone who physically or mentally threatens you? N   Is it safe for you to go home? Y     Coordination of Care Questionaire:   1. Have you been to the ER, urgent care clinic since your last visit? Hospitalized since your last visit? No     2. Have you seen or consulted any other health care providers outside of the [address] [place] [address] since your last visit? Include any pap smears or colon screening. Yes, Dr Janette Naranjo neurosurgeon f/u 2019 and Dr De Barrera 2019     Advanced Directive:   1. Do you have an Advanced Directive? No     2. Would you like information on Advanced Directives?    No       Health Maintenance Due   Topic Date Due    DTaP/Tdap/Td series (1 - Tdap) 1997    PAP AKA CERVICAL CYTOLOGY  1997     Needs mammo   Pap smear done

## 2019-06-20 ENCOUNTER — HOSPITAL ENCOUNTER (OUTPATIENT)
Dept: LAB | Age: 44
Discharge: HOME | End: 2019-06-20
Payer: COMMERCIAL

## 2019-06-20 DIAGNOSIS — R73.9 ELEVATED BLOOD SUGAR: ICD-10-CM

## 2019-06-20 DIAGNOSIS — I10 ESSENTIAL HYPERTENSION: ICD-10-CM

## 2019-06-20 LAB
ALBUMIN SERPL-MCNC: 3.8 G/DL (ref 3.4–5)
ALBUMIN/GLOB SERPL: 0.9 {RATIO} (ref 0.8–1.7)
ALP SERPL-CCNC: 123 U/L (ref 45–117)
ALT SERPL-CCNC: 17 U/L (ref 13–56)
ANION GAP SERPL CALC-SCNC: 10 MMOL/L (ref 3–18)
AST SERPL-CCNC: 10 U/L (ref 15–37)
BILIRUB SERPL-MCNC: 0.4 MG/DL (ref 0.2–1)
BUN SERPL-MCNC: 13 MG/DL (ref 7–18)
BUN/CREAT SERPL: 14 (ref 12–20)
CALCIUM SERPL-MCNC: 9.9 MG/DL (ref 8.5–10.1)
CHLORIDE SERPL-SCNC: 104 MMOL/L (ref 100–108)
CHOLEST SERPL-MCNC: 198 MG/DL
CO2 SERPL-SCNC: 28 MMOL/L (ref 21–32)
CREAT SERPL-MCNC: 0.96 MG/DL (ref 0.6–1.3)
ERYTHROCYTE [DISTWIDTH] IN BLOOD BY AUTOMATED COUNT: 16 % (ref 11.6–14.5)
GLOBULIN SER CALC-MCNC: 4.3 G/DL (ref 2–4)
GLUCOSE SERPL-MCNC: 123 MG/DL (ref 74–99)
HBA1C MFR BLD: 6.4 % (ref 4.2–5.6)
HCT VFR BLD AUTO: 42.4 % (ref 35–45)
HDLC SERPL-MCNC: 55 MG/DL (ref 40–60)
HDLC SERPL: 3.6 {RATIO} (ref 0–5)
HGB BLD-MCNC: 13.5 G/DL (ref 12–16)
LDLC SERPL CALC-MCNC: 121.6 MG/DL (ref 0–100)
LIPID PROFILE,FLP: ABNORMAL
MCH RBC QN AUTO: 32.5 PG (ref 24–34)
MCHC RBC AUTO-ENTMCNC: 31.8 G/DL (ref 31–37)
MCV RBC AUTO: 102.2 FL (ref 74–97)
PLATELET # BLD AUTO: 419 K/UL (ref 135–420)
PMV BLD AUTO: 10.2 FL (ref 9.2–11.8)
POTASSIUM SERPL-SCNC: 4.3 MMOL/L (ref 3.5–5.5)
PROT SERPL-MCNC: 8.1 G/DL (ref 6.4–8.2)
RBC # BLD AUTO: 4.15 M/UL (ref 4.2–5.3)
SODIUM SERPL-SCNC: 142 MMOL/L (ref 136–145)
TRIGL SERPL-MCNC: 107 MG/DL (ref ?–150)
VLDLC SERPL CALC-MCNC: 21.4 MG/DL
WBC # BLD AUTO: 11.7 K/UL (ref 4.6–13.2)

## 2019-06-20 PROCEDURE — 80061 LIPID PANEL: CPT

## 2019-06-20 PROCEDURE — 80053 COMPREHEN METABOLIC PANEL: CPT

## 2019-06-20 PROCEDURE — 85027 COMPLETE CBC AUTOMATED: CPT

## 2019-06-20 PROCEDURE — 36415 COLL VENOUS BLD VENIPUNCTURE: CPT

## 2019-06-20 PROCEDURE — 83036 HEMOGLOBIN GLYCOSYLATED A1C: CPT

## 2019-06-27 ENCOUNTER — OFFICE VISIT (OUTPATIENT)
Dept: FAMILY MEDICINE CLINIC | Age: 44
End: 2019-06-27

## 2019-06-27 VITALS
OXYGEN SATURATION: 98 % | DIASTOLIC BLOOD PRESSURE: 80 MMHG | WEIGHT: 267.2 LBS | HEART RATE: 98 BPM | SYSTOLIC BLOOD PRESSURE: 120 MMHG | BODY MASS INDEX: 40.5 KG/M2 | RESPIRATION RATE: 20 BRPM | TEMPERATURE: 98.3 F | HEIGHT: 68 IN

## 2019-06-27 DIAGNOSIS — I10 ESSENTIAL HYPERTENSION: Primary | ICD-10-CM

## 2019-06-27 DIAGNOSIS — Z23 ENCOUNTER FOR IMMUNIZATION: ICD-10-CM

## 2019-06-27 PROBLEM — R73.03 PREDIABETES: Status: ACTIVE | Noted: 2019-06-27

## 2019-06-27 RX ORDER — LOSARTAN POTASSIUM 100 MG/1
100 TABLET ORAL DAILY
Qty: 90 TAB | Refills: 0 | Status: SHIPPED | OUTPATIENT
Start: 2019-06-27 | End: 2019-07-26 | Stop reason: SDUPTHER

## 2019-06-27 RX ORDER — CHLORTHALIDONE 25 MG/1
25 TABLET ORAL DAILY
Qty: 90 TAB | Refills: 0 | Status: SHIPPED | OUTPATIENT
Start: 2019-06-27 | End: 2019-07-26 | Stop reason: SDUPTHER

## 2019-06-27 RX ORDER — AMLODIPINE BESYLATE 5 MG/1
5 TABLET ORAL DAILY
Qty: 90 TAB | Refills: 0 | Status: SHIPPED | OUTPATIENT
Start: 2019-06-27 | End: 2019-07-26 | Stop reason: SINTOL

## 2019-06-27 NOTE — PROGRESS NOTES
Immunization/s administered 6/27/2019 by Angelia Irby LPN with guardian's consent. Patient tolerated procedure well. No reactions noted. Bexsero dose #1 left deltoid.

## 2019-06-27 NOTE — PROGRESS NOTES
Elliot Thompson is a 38 y.o. female (: 1975) presenting to address:    Chief Complaint   Patient presents with    Hypertension       Vitals:    19 1425   BP: 120/80   Pulse: 98   Resp: 20   Temp: 98.3 °F (36.8 °C)   TempSrc: Oral   SpO2: 98%   Weight: 267 lb 3.2 oz (121.2 kg)   Height: 5' 7.5\" (1.715 m)   PainSc:   0 - No pain       Learning Assessment:     Learning Assessment 2019   PRIMARY LEARNER Patient   HIGHEST LEVEL OF EDUCATION - PRIMARY LEARNER  4 YEARS OF COLLEGE   BARRIERS PRIMARY LEARNER NONE   CO-LEARNER CAREGIVER No   PRIMARY LANGUAGE ENGLISH    NEED No   LEARNER PREFERENCE PRIMARY DEMONSTRATION   ANSWERED BY self   RELATIONSHIP SELF     Depression Screening:     3 most recent PHQ Screens 2019   Little interest or pleasure in doing things Not at all   Feeling down, depressed, irritable, or hopeless Not at all   Total Score PHQ 2 0     Fall Risk Assessment:   No flowsheet data found. Abuse Screening:     Abuse Screening Questionnaire 2019   Do you ever feel afraid of your partner? N   Are you in a relationship with someone who physically or mentally threatens you? N   Is it safe for you to go home? Y     Coordination of Care Questionaire:   1. Have you been to the ER, urgent care clinic since your last visit? Hospitalized since your last visit? NO    2. Have you seen or consulted any other health care providers outside of the [address] [place] [address] since your last visit? Include any pap smears or colon screening. NO    Advanced Directive:   1. Do you have an Advanced Directive? YES    2. Would you like information on Advanced Directives?  NO

## 2019-06-27 NOTE — PATIENT INSTRUCTIONS

## 2019-07-26 ENCOUNTER — OFFICE VISIT (OUTPATIENT)
Dept: FAMILY MEDICINE CLINIC | Age: 44
End: 2019-07-26

## 2019-07-26 VITALS
HEART RATE: 93 BPM | TEMPERATURE: 98 F | RESPIRATION RATE: 20 BRPM | SYSTOLIC BLOOD PRESSURE: 135 MMHG | WEIGHT: 270 LBS | OXYGEN SATURATION: 100 % | HEIGHT: 68 IN | BODY MASS INDEX: 40.92 KG/M2 | DIASTOLIC BLOOD PRESSURE: 90 MMHG

## 2019-07-26 DIAGNOSIS — R10.12 LUQ ABDOMINAL PAIN: Primary | ICD-10-CM

## 2019-07-26 DIAGNOSIS — Z85.07 HISTORY OF PANCREATIC CANCER: ICD-10-CM

## 2019-07-26 DIAGNOSIS — I10 ESSENTIAL HYPERTENSION: ICD-10-CM

## 2019-07-26 RX ORDER — LOSARTAN POTASSIUM 100 MG/1
100 TABLET ORAL DAILY
Qty: 90 TAB | Refills: 0 | Status: SHIPPED | OUTPATIENT
Start: 2019-07-26

## 2019-07-26 RX ORDER — CHLORTHALIDONE 25 MG/1
25 TABLET ORAL DAILY
Qty: 90 TAB | Refills: 0 | Status: SHIPPED | OUTPATIENT
Start: 2019-07-26

## 2019-07-26 RX ORDER — SPIRONOLACTONE 100 MG/1
100 TABLET, FILM COATED ORAL DAILY
Qty: 90 TAB | Refills: 0 | Status: SHIPPED | OUTPATIENT
Start: 2019-07-26

## 2019-07-26 NOTE — PROGRESS NOTES
Devorah Murray is a 38 y.o. female (: 1975) presenting to address:    Chief Complaint   Patient presents with    Hypertension       Vitals:    19 1512 19 1517   BP: (!) 136/92 135/90   Pulse: 93    Resp: 20    Temp: 98 °F (36.7 °C)    SpO2: 100%    Weight: 270 lb (122.5 kg)    Height: 5' 7.5\" (1.715 m)    PainSc:   2    PainLoc: Head        Learning Assessment:     Learning Assessment 2019   PRIMARY LEARNER Patient   HIGHEST LEVEL OF EDUCATION - PRIMARY LEARNER  4 YEARS OF COLLEGE   BARRIERS PRIMARY LEARNER NONE   CO-LEARNER CAREGIVER No   PRIMARY LANGUAGE ENGLISH    NEED No   LEARNER PREFERENCE PRIMARY DEMONSTRATION   ANSWERED BY self   RELATIONSHIP SELF     Depression Screening:     3 most recent PHQ Screens 2019   Little interest or pleasure in doing things Not at all   Feeling down, depressed, irritable, or hopeless Not at all   Total Score PHQ 2 0     Fall Risk Assessment:     Fall Risk Assessment, last 12 mths 2019   Able to walk? Yes   Fall in past 12 months? No     Abuse Screening:     Abuse Screening Questionnaire 2019   Do you ever feel afraid of your partner? N   Are you in a relationship with someone who physically or mentally threatens you? N   Is it safe for you to go home? Y     Coordination of Care Questionaire:   1. Have you been to the ER, urgent care clinic since your last visit? Hospitalized since your last visit? NO    2. Have you seen or consulted any other health care providers outside of the [address] [place] [address] since your last visit? Include any pap smears or colon screening. YES. neurology    Advanced Directive:   1. Do you have an Advanced Directive? YES    2. Would you like information on Advanced Directives?  NO

## 2019-07-26 NOTE — PROGRESS NOTES
Assessment/Plan:    1. Essential hypertension  -stop norvasc given SE.  incr dose spironolactone. - spironolactone (ALDACTONE) 100 mg tablet; Take 1 Tab by mouth daily. Dispense: 90 Tab; Refill: 0  - chlorthalidone (HYGROTEN) 25 mg tablet; Take 1 Tab by mouth daily. Dispense: 90 Tab; Refill: 0  - losartan (COZAAR) 100 mg tablet; Take 1 Tab by mouth daily. Dispense: 90 Tab; Refill: 0    2. LUQ abdominal pain with firm mass appreciated on exam- in setting of h/o pancreatic ca, will do CT. Could be stool or scar tissue  - CT ABD WO CONT; Future        The plan was discussed with the patient. The patient verbalized understanding and is in agreement with the plan. All medication potential side effects were discussed with the patient. Health Maintenance:   Health Maintenance   Topic Date Due    MenB Meningococcal topic (1 of 2 - Risk Bexsero 2-dose series) 04/07/1986    DTaP/Tdap/Td series (1 - Tdap) 04/07/1997    Pneumococcal 0-64 years (3 of 3 - PPSV23) 07/26/2019    Influenza Age 9 to Adult  08/01/2019    PAP AKA CERVICAL CYTOLOGY  01/01/2021       Jillian Al is a 38 y.o. female and presents with Hypertension     Subjective:  htn - dose norvasc decr due to swelling and hctz was changed to chlorthalidone. bp still a little high. She's not compliant with amlodipine b/c it makes her stomach hurt. Pt c/o LUQ abd pain x 1 week, intermittent. Nothing triggers it. No constipation. Episodes last 5-10 min. ROS:  Constitutional: No recent weight change. No weakness/fatigue. No f/c. Cardiovascular: No CP/palpitations. No ORANTES/orthopnea/PND. Respiratory: No cough/sputum, dyspnea, wheezing. Gastointestinal: No dysphagia, reflux. No n/v. No constipation/diarrhea. No melena/rectal bleeding. Genitourinary: No dysuria, urinary hesitancy, nocturia, hematuria. No incontinence. The problem list was updated as a part of today's visit.   Patient Active Problem List   Diagnosis Code    Essential hypertension I10    LAChA infundibulum vs small aneurusm I67.1    RA2 azygous cerebral aneurysm I67.1    Family history of cerebral aneurysm Z82.49    Obesity, morbid (HCC) E66.01    Brain aneurysm I67.1    History of pancreatic cancer Z85.07    H/O splenectomy Z90.81    S/P partial gastrectomy Z90.3    Prediabetes R73.03       The PSH, FH were reviewed. SH:  Social History     Tobacco Use    Smoking status: Never Smoker    Smokeless tobacco: Never Used   Substance Use Topics    Alcohol use: No    Drug use: No       Medications/Allergies:  Current Outpatient Medications on File Prior to Visit   Medication Sig Dispense Refill    amLODIPine (NORVASC) 5 mg tablet Take 1 Tab by mouth daily. 90 Tab 0    chlorthalidone (HYGROTEN) 25 mg tablet Take 1 Tab by mouth daily. 90 Tab 0    losartan (COZAAR) 100 mg tablet Take 1 Tab by mouth daily. 90 Tab 0    spironolactone (ALDACTONE) 50 mg tablet Take 1 Tab by mouth daily. 90 Tab 0    topiramate (TOPAMAX) 50 mg tablet Take 1 Tab by mouth two (2) times a day. 6     No current facility-administered medications on file prior to visit. Allergies   Allergen Reactions    Zofran (As Hydrochloride) [Ondansetron Hcl] Anxiety     agitation       Objective:  Visit Vitals  BP (!) 136/92 (BP 1 Location: Left arm, BP Patient Position: Sitting) Comment: machine   Pulse 93   Temp 98 °F (36.7 °C)   Resp 20   Ht 5' 7.5\" (1.715 m)   Wt 270 lb (122.5 kg)   SpO2 100%   BMI 41.66 kg/m²      Constitutional: Well developed, nourished, no distress, alert, obese habitus   CV: S1, S2.  RRR. No murmurs/rubs. No edema. Pulm: No abnormalities on inspection. Clear to auscultation bilaterally. No wheezing/rhonchi. Normal effort. GI: Soft, LUQ tenderness with mobile masses immediately under lateral aspect surgical scar, nondistended. Normal active bowel sounds.

## 2019-08-07 ENCOUNTER — PATIENT MESSAGE (OUTPATIENT)
Dept: FAMILY MEDICINE CLINIC | Age: 44
End: 2019-08-07

## 2019-08-08 DIAGNOSIS — Z85.07 HISTORY OF PANCREATIC CANCER: ICD-10-CM

## 2019-08-08 DIAGNOSIS — R10.12 LUQ PAIN: Primary | ICD-10-CM

## 2019-08-09 NOTE — TELEPHONE ENCOUNTER
Ac [phone] approved [id number] CT abd w contrast for medical necessity today after phone call. They had denied [phone] but they also transposed the diagnosis code so that held it up. The pt can call for status to see if final approval (pending for site verification-temporary hold status due to out of state ins verification) on Monday 8/12/19. Left [facility] for pt that I obtained med necessity for exam but it will still need the final facility verification before she can schedule.

## 2019-08-22 ENCOUNTER — TELEPHONE (OUTPATIENT)
Dept: FAMILY MEDICINE CLINIC | Age: 44
End: 2019-08-22

## 2019-08-22 NOTE — TELEPHONE ENCOUNTER
Pt called for CT results. Called MRI CT . Pt had scan 8/17/19,has not been read yet! Rep said she changed it to a wet read and we should get result in the morning.

## 2019-09-05 DIAGNOSIS — Z85.07 HISTORY OF PANCREATIC CANCER: ICD-10-CM

## 2019-09-05 DIAGNOSIS — R10.12 LUQ PAIN: ICD-10-CM

## 2022-03-14 NOTE — PROGRESS NOTES
Assessment/Plan:    1. Essential hypertension  -decr norvasc to 5mg given swelling. Change hctz to chlorthalidone. F/u in 1mo  - amLODIPine (NORVASC) 5 mg tablet; Take 1 Tab by mouth daily. Dispense: 90 Tab; Refill: 0  - chlorthalidone (HYGROTEN) 25 mg tablet; Take 1 Tab by mouth daily. Dispense: 90 Tab; Refill: 0  - losartan (COZAAR) 100 mg tablet; Take 1 Tab by mouth daily. Dispense: 90 Tab; Refill: 0    2. Encounter for immunization  - MENINGOCOCCAL B (BEXSERO) RECOMBINANT PROT W/OUT MEMBR VESIC VACC IM  - ME IMMUNIZ ADMIN,1 SINGLE/COMB VAC/TOXOID      The plan was discussed with the patient. The patient verbalized understanding and is in agreement with the plan. All medication potential side effects were discussed with the patient. Health Maintenance:   Health Maintenance   Topic Date Due    MenB Meningococcal topic (1 of 2 - Risk Bexsero 2-dose series) 04/07/1986    DTaP/Tdap/Td series (1 - Tdap) 04/07/1997    Pneumococcal 0-64 years (3 of 3 - PPSV23) 07/26/2019    Influenza Age 9 to Adult  08/01/2019    PAP AKA CERVICAL CYTOLOGY  01/01/2021       Amanuel Amanda is a 38 y.o. female and presents with Hypertension     Subjective:  HTN - having some swelling from norvasc. Spironolactone was added at last visit. Potassium and cr are stable. Labs recently showed prediabetes. A1c 6.4.     ROS:  Constitutional: No recent weight change. No weakness/fatigue. No f/c. Cardiovascular: No CP/palpitations. No ORANTES/orthopnea/PND. Respiratory: No cough/sputum, dyspnea, wheezing. The problem list was updated as a part of today's visit.   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 SH:  Social History     Tobacco Use    Smoking status: Never Smoker    Smokeless tobacco: Never Used   Substance Use Topics    Alcohol use: No    Drug use: No       Medications/Allergies:  Current Outpatient Medications on File Prior to Visit   Medication Sig Dispense Refill    spironolactone (ALDACTONE) 50 mg tablet Take 1 Tab by mouth daily. 90 Tab 0    topiramate (TOPAMAX) 50 mg tablet Take 1 Tab by mouth two (2) times a day. 6    losartan-hydroCHLOROthiazide (HYZAAR) 100-25 mg per tablet Take 1 Tab by mouth daily. 90 Tab 0    amLODIPine (NORVASC) 10 mg tablet Take  by mouth daily. No current facility-administered medications on file prior to visit. Allergies   Allergen Reactions    Zofran (As Hydrochloride) [Ondansetron Hcl] Anxiety     agitation       Objective:  Visit Vitals  /80 (BP 1 Location: Left arm, BP Patient Position: Sitting)   Pulse 98   Temp 98.3 °F (36.8 °C) (Oral)   Resp 20   Ht 5' 7.5\" (1.715 m)   Wt 267 lb 3.2 oz (121.2 kg)   SpO2 98%   BMI 41.23 kg/m²      Constitutional: Well developed, nourished, no distress, alert, obese habitus   CV: S1, S2.  RRR. No murmurs/rubs. tr edema. Pulm: No abnormalities on inspection. Clear to auscultation bilaterally. No wheezing/rhonchi. Normal effort. GI: Soft, nontender, nondistended. Normal active bowel sounds. To get better and follow your care plan as instructed. Improve ambulation, ADLs and quality of life.

## 2022-03-18 PROBLEM — R73.03 PREDIABETES: Status: ACTIVE | Noted: 2019-06-27

## 2022-03-18 PROBLEM — I10 ESSENTIAL HYPERTENSION: Status: ACTIVE | Noted: 2017-12-04

## 2022-03-18 PROBLEM — Z90.81 H/O SPLENECTOMY: Status: ACTIVE | Noted: 2019-05-31

## 2022-03-18 PROBLEM — Z82.49 FAMILY HISTORY OF CEREBRAL ANEURYSM: Status: ACTIVE | Noted: 2017-12-05

## 2022-03-18 PROBLEM — E66.01 OBESITY, MORBID (HCC): Status: ACTIVE | Noted: 2017-12-05

## 2022-03-19 PROBLEM — Z85.07 HISTORY OF PANCREATIC CANCER: Status: ACTIVE | Noted: 2019-05-31

## 2022-03-19 PROBLEM — I67.1 CEREBRAL ANEURYSM WITHOUT RUPTURE: Status: ACTIVE | Noted: 2017-12-04

## 2022-03-19 PROBLEM — I67.1 ANTERIOR CEREBRAL ARTERY ANEURYSM: Status: ACTIVE | Noted: 2017-12-05

## 2022-03-19 PROBLEM — I67.1 BRAIN ANEURYSM: Status: ACTIVE | Noted: 2018-08-30

## 2022-03-19 PROBLEM — Z90.3 S/P PARTIAL GASTRECTOMY: Status: ACTIVE | Noted: 2019-05-31

## 2023-01-31 RX ORDER — SPIRONOLACTONE 100 MG/1
100 TABLET, FILM COATED ORAL DAILY
COMMUNITY
Start: 2019-07-26

## 2023-01-31 RX ORDER — CHLORTHALIDONE 25 MG/1
25 TABLET ORAL DAILY
COMMUNITY
Start: 2019-07-26

## 2023-01-31 RX ORDER — TOPIRAMATE 50 MG/1
50 TABLET, FILM COATED ORAL 2 TIMES DAILY
COMMUNITY
Start: 2019-05-31

## 2023-01-31 RX ORDER — LOSARTAN POTASSIUM 100 MG/1
100 TABLET ORAL DAILY
COMMUNITY
Start: 2019-07-26

## 2023-10-20 NOTE — Clinical Note
Angiogram injection 1:  LICA. Single view. Hand injected. Total contrast volume = 6 mL. Patient informed directly

## (undated) DEVICE — Device

## (undated) DEVICE — PREP SKN DURAPREP 26ML APPL --

## (undated) DEVICE — NEEDLE HYPO 22GA L1.5IN BLK POLYPR HUB S STL REG BVL STR

## (undated) DEVICE — BAG INFUS PRESSURE 1000ML -- CONVERT TO ITEM 360122

## (undated) DEVICE — DRAPE THER FLUID WARMING 66X44 IN FLAT SLUSH DBL DISC ORS

## (undated) DEVICE — SET LBLING PEN POLYPR LBL PAL

## (undated) DEVICE — MAYO STAND COVER: Brand: CONVERTORS

## (undated) DEVICE — COVER SURG EQUIP DRP MICSCP 118IN LEN 43IN W ZEISS OPMI

## (undated) DEVICE — BLADE ASSEMB CLP HAIR FINE --

## (undated) DEVICE — PINNACLE INTRODUCER SHEATH: Brand: PINNACLE

## (undated) DEVICE — COVER LT HNDL BLU PLAS

## (undated) DEVICE — ADULT SPO2 SENSOR: Brand: NELLCOR

## (undated) DEVICE — BAND RUB 1/8X2.5IN STRL --

## (undated) DEVICE — CATHETER ANGIO BER2 038 AD 4 FRX100 CM TEMPO AQUA

## (undated) DEVICE — SUT NRLN 0 18IN CT2 DETACH BLK --

## (undated) DEVICE — 3M™ STERI-STRIP™ REINFORCED ADHESIVE SKIN CLOSURES, R1549, 1/2 IN X 2 IN (12 MM X 50 MM), 6 STRIPS/ENVELOPE: Brand: 3M™ STERI-STRIP™

## (undated) DEVICE — ROCKER SWITCH PENCIL HOLSTER: Brand: VALLEYLAB

## (undated) DEVICE — SURGIFOAM SPNG SZ 100

## (undated) DEVICE — SUTURE ETHLN SZ 2-0 L18IN NONABSORBABLE BLK L26MM PS 3/8 585H

## (undated) DEVICE — ARGON FEMORAL ANGIO DRAPE 77" X 125": Brand: ARGON FEMORAL ANGIO DRAPE

## (undated) DEVICE — SPONGE HEMOSTAT CELLULS 4X8IN -- SURGICEL

## (undated) DEVICE — STERILE POLYISOPRENE POWDER-FREE SURGICAL GLOVES: Brand: PROTEXIS

## (undated) DEVICE — ADPT Y GATEWAY PLUS 3MM --

## (undated) DEVICE — ANGIOGRAPHY KIT CUST DEPAUL

## (undated) DEVICE — ROYAL SILK SURGICAL GOWN, XL: Brand: CONVERTORS

## (undated) DEVICE — SYR 10ML CTRL LR LCK NSAF LF --

## (undated) DEVICE — TOOL 9MH30 LEGEND 9CM 3MM MH: Brand: MIDAS REX

## (undated) DEVICE — STAPLER SKIN H3.9MM WIRE DIA0.58MM CRWN 6.9MM 35 STPL FIX

## (undated) DEVICE — GAUZE,SPONGE,8"X4",12PLY,XRAY,STRL,LF: Brand: MEDLINE

## (undated) DEVICE — CATHETER DRNGE 30FR 4 WNG DISP FOR NEPHSTMY MALECOTS

## (undated) DEVICE — GUIDEWIRE VASC L60CM DIA0018IN NIT W PLAT TIP MAK

## (undated) DEVICE — INTRODUCER SHTH 4FR DIL TIP L25MM CANN L23CM 0.035IN

## (undated) DEVICE — SYR 5ML 1/5 GRAD LL NSAF LF --

## (undated) DEVICE — SUTURE NRLN SZ 4-0 L18IN NONABSORBABLE BLK L13MM TF 1/2 CIR C584D

## (undated) DEVICE — SOLUTION IRRIG 1000ML H2O STRL BLT

## (undated) DEVICE — TOOL F2/8TA23 LEGEND 8CM 2.3MM TAPER: Brand: MIDAS REX™

## (undated) DEVICE — 1010 S-DRAPE TOWEL DRAPE 10/BX: Brand: STERI-DRAPE™

## (undated) DEVICE — MEDI-VAC NON-CONDUCTIVE SUCTION TUBING 6MM X 6.1M (20 FT.) L: Brand: CARDINAL HEALTH

## (undated) DEVICE — SOL INJ L R 1000ML BG --

## (undated) DEVICE — INTENDED FOR TISSUE SEPARATION, AND OTHER PROCEDURES THAT REQUIRE A SHARP SURGICAL BLADE TO PUNCTURE OR CUT.: Brand: BARD-PARKER ® CARBON RIB-BACK BLADES

## (undated) DEVICE — STERILE LATEX POWDER-FREE SURGICAL GLOVESWITH NITRILE COATING: Brand: PROTEXIS

## (undated) DEVICE — KERLIX BANDAGE ROLL: Brand: KERLIX

## (undated) DEVICE — SOLUTION IRRIG 1000ML LAC RINGER PLAS POUR BTL

## (undated) DEVICE — DEVICE VASC CLSR 5FR FEM ART INTVASC EXOSEAL

## (undated) DEVICE — (D)SEALANT HEMSTAT SURGIFLO -- DISC BY MFR  USE ITEM 300438

## (undated) DEVICE — CODMAN® SURGICAL PATTIES 1/2" X 1/2" (1.27CM X 1.27CM): Brand: CODMAN®

## (undated) DEVICE — TABLE COVER: Brand: CONVERTORS

## (undated) DEVICE — DRAPE,UTILITY,TAPE,15X26,STERILE: Brand: MEDLINE

## (undated) DEVICE — 10FR FRAZIER SUCTION HANDLE: Brand: CARDINAL HEALTH

## (undated) DEVICE — RADIFOCUS TORQUE DEVICE MULTI-TORQUE VISE: Brand: RADIFOCUS TORQUE DEVICE

## (undated) DEVICE — MICROPUNCTURE INTRODUCER SET SILHOUETTE TRANSITIONLESS PUSH-PLUS DESIGN - STIFFENED CANNULA WITH STAINLESS STEEL WIRE GUIDE: Brand: MICROPUNCTURE

## (undated) DEVICE — PREP CHLORAPREP 10.5 ML ORG --

## (undated) DEVICE — DRAPE TWL SURG 16X26IN BLU [ORB06] [ALLCARE INC]

## (undated) DEVICE — TOWEL SURG W16XL26IN BLU NONFENESTRATED DLX ST 2 PER PK

## (undated) DEVICE — GUIDEWIRE VASC L150CM DIA0035IN PTFE STIFF FIX COR 3MM J TIP

## (undated) DEVICE — RADIFOCUS GLIDEWIRE: Brand: GLIDEWIRE

## (undated) DEVICE — ARGYLE FRAZIER SURGICAL SUCTION INSTRUMENT 12 FR/CH (4.0 MM): Brand: ARGYLE

## (undated) DEVICE — BLADE SURG HK MIC 0.38 MM FOR FN INCISION FEATHER

## (undated) DEVICE — CODMAN® SURGICAL PATTIES 1/2" X 3" (1.27CM X 7.62CM): Brand: CODMAN®

## (undated) DEVICE — STOPCOCK IV 3W HI PRSS RT ADPT -- MARQUIS

## (undated) DEVICE — GAUZE SPONGES,12 PLY: Brand: CURITY

## (undated) DEVICE — MAYFIELD® DISPOSABLE ADULT SKULL PIN (PLASTIC BASE): Brand: MAYFIELD®

## (undated) DEVICE — SUTURE VCRL SZ 2-0 L18IN ABSRB UD CT-1 L36MM 1/2 CIR J839D

## (undated) DEVICE — 3M™ IOBAN™ 2 ANTIMICROBIAL INCISE DRAPE 6640EZ: Brand: IOBAN™ 2

## (undated) DEVICE — SNAP KOVER: Brand: UNBRANDED

## (undated) DEVICE — COVER US PRB W15XL120CM W/ GEL RUBBERBAND TAPE STRP FLD GEN

## (undated) DEVICE — SOLIDIFIER MEDC 15000ML -- MEDICHOICE

## (undated) DEVICE — CAUTERY TIP POLISHER: Brand: DEVON

## (undated) DEVICE — X-RAY DETECTABLE SPONGES,12 PLY: Brand: VISTEC

## (undated) DEVICE — (D)SYR 20ML LR LCK 1ML GRAD -- DISC BY MFR  USE ITEM 304149

## (undated) DEVICE — BANDAGE COMPR W4INXL5YD ELAS CLP CLSR

## (undated) DEVICE — CATH IV AUTOGRD BC GRN 18GA 30 -- INSYTE

## (undated) DEVICE — SURGICAL PROCEDURE PACK INSTRUMENT INSTRUMENT

## (undated) DEVICE — SOLUTION SET, MALE LUER LOCK ADAPTER

## (undated) DEVICE — DRESSING,GAUZE,XEROFORM,CURAD,5"X9",ST: Brand: CURAD

## (undated) DEVICE — 3M™ TEGADERM™ TRANSPARENT FILM DRESSING FRAME STYLE, 1626W, 4 IN X 4-3/4 IN (10 CM X 12 CM), 50/CT 4CT/CASE: Brand: 3M™ TEGADERM™

## (undated) DEVICE — SYRINGE TB 1ML NDL 25GA L0.625IN PLAS SLIP TIP CONVENTIONAL

## (undated) DEVICE — SHEET,DRAPE,70X100,STERILE: Brand: MEDLINE

## (undated) DEVICE — KIT CATH OD16FR 5ML BLLN SIL URIN INDWL STR TIP INF CTRL

## (undated) DEVICE — OCCLUSIVE GAUZE STRIP,3% BISMUTH TRIBROMOPHENATE IN PETROLATUM BLEND: Brand: XEROFORM

## (undated) DEVICE — FABRIC REINFORCED, SURGICAL GOWN, XL: Brand: CONVERTORS

## (undated) DEVICE — MANIFOLD ANGIO HI PRSS 2 VLV W/ ROT ADPT BLK BODY OFF HNDL

## (undated) DEVICE — GAUZE SPONGES,16 PLY: Brand: CURITY